# Patient Record
Sex: MALE | Race: WHITE | NOT HISPANIC OR LATINO | Employment: FULL TIME | URBAN - METROPOLITAN AREA
[De-identification: names, ages, dates, MRNs, and addresses within clinical notes are randomized per-mention and may not be internally consistent; named-entity substitution may affect disease eponyms.]

---

## 2018-03-02 ENCOUNTER — HOSPITAL ENCOUNTER (EMERGENCY)
Facility: HOSPITAL | Age: 43
Discharge: HOME | End: 2018-03-02
Attending: EMERGENCY MEDICINE
Payer: OTHER MISCELLANEOUS

## 2018-03-02 VITALS
HEART RATE: 82 BPM | HEIGHT: 67 IN | OXYGEN SATURATION: 98 % | DIASTOLIC BLOOD PRESSURE: 85 MMHG | SYSTOLIC BLOOD PRESSURE: 159 MMHG | WEIGHT: 183 LBS | TEMPERATURE: 98 F | BODY MASS INDEX: 28.72 KG/M2 | RESPIRATION RATE: 16 BRPM

## 2018-03-02 DIAGNOSIS — S46.002A ROTATOR CUFF INJURY, LEFT, INITIAL ENCOUNTER: Primary | ICD-10-CM

## 2018-03-02 PROCEDURE — 99283 EMERGENCY DEPT VISIT LOW MDM: CPT | Mod: 25

## 2018-03-02 PROCEDURE — 63600175 PHARM REV CODE 636 W HCPCS: Performed by: EMERGENCY MEDICINE

## 2018-03-02 PROCEDURE — 96372 THER/PROPH/DIAG INJ SC/IM: CPT

## 2018-03-02 PROCEDURE — 25000003 PHARM REV CODE 250: Performed by: EMERGENCY MEDICINE

## 2018-03-02 RX ORDER — ASPIRIN 81 MG/1
81 TABLET ORAL DAILY
Status: ON HOLD | COMMUNITY
End: 2020-11-19

## 2018-03-02 RX ORDER — HYDROCODONE BITARTRATE AND ACETAMINOPHEN 7.5; 325 MG/1; MG/1
1 TABLET ORAL EVERY 6 HOURS PRN
COMMUNITY
End: 2018-03-02 | Stop reason: ALTCHOICE

## 2018-03-02 RX ORDER — MEPERIDINE HYDROCHLORIDE 25 MG/ML
75 INJECTION INTRAMUSCULAR; INTRAVENOUS; SUBCUTANEOUS
Status: COMPLETED | OUTPATIENT
Start: 2018-03-02 | End: 2018-03-02

## 2018-03-02 RX ORDER — NAPROXEN 250 MG/1
500 TABLET ORAL
Status: COMPLETED | OUTPATIENT
Start: 2018-03-02 | End: 2018-03-02

## 2018-03-02 RX ORDER — DICLOFENAC SODIUM 50 MG/1
50 TABLET, DELAYED RELEASE ORAL 3 TIMES DAILY
Qty: 15 TABLET | Refills: 0 | Status: SHIPPED | OUTPATIENT
Start: 2018-03-02 | End: 2018-03-05 | Stop reason: SDUPTHER

## 2018-03-02 RX ORDER — OXYCODONE AND ACETAMINOPHEN 10; 325 MG/1; MG/1
1 TABLET ORAL EVERY 4 HOURS PRN
Qty: 15 TABLET | Refills: 0 | Status: ON HOLD | OUTPATIENT
Start: 2018-03-02 | End: 2020-11-19

## 2018-03-02 RX ADMIN — NAPROXEN 500 MG: 250 TABLET ORAL at 11:03

## 2018-03-02 RX ADMIN — MEPERIDINE HYDROCHLORIDE 75 MG: 25 INJECTION INTRAMUSCULAR; INTRAVENOUS; SUBCUTANEOUS at 11:03

## 2018-03-02 NOTE — ED PROVIDER NOTES
"Encounter Date: 3/2/2018    SCRIBE #1 NOTE: I, Smitha Vasquez, am scribing for, and in the presence of, Dr. Ley.       History     Chief Complaint   Patient presents with    Shoulder Pain     " hurt at work on wednesday " steroid injection Wednesday 03/02/2018 11:31 AM     Chief complaint: Shoulder Pain      John Ball is a 43 y.o. male who presents to the ED with complaints of gradually worsening left shoulder pain that started Wednesday (2/28). The patient states having a gradual onset of pain after crawling under pipes and climbing a ladder. The patient visited an Urgent Clinic after work on Wednesday and had an x-ray performed on him. Imaging showed negative results for anything abnormal and the patient was told he could go back to work today doing "light" work. Patient states pain worsened over night and he is unable to move it in specific directions. He visited his PCP yesterday (3/1) and was given a steroid and numbing injection that provided mild improvement to symptoms, but has since wore off and symptoms have returned. Patient is currently taking Percocet for pain with mild improvements to pain. The patient denies onset of any other new symptoms. He has no other medical concerns at the moment.       The history is provided by the patient.     Review of patient's allergies indicates:  No Known Allergies  History reviewed. No pertinent past medical history.  Past Surgical History:   Procedure Laterality Date    APPENDECTOMY      SINUS SURGERY      SPLENECTOMY, TOTAL      TONSILLECTOMY       History reviewed. No pertinent family history.  Social History   Substance Use Topics    Smoking status: Former Smoker     Packs/day: 0.25     Years: 0.50     Quit date: 5/5/1997    Smokeless tobacco: Never Used    Alcohol use Yes      Comment: social      Review of Systems   Constitutional: Negative for fever.   HENT: Negative for congestion.    Respiratory: Negative for shortness of breath.  "   Gastrointestinal: Negative for abdominal distention, abdominal pain and nausea.   Musculoskeletal: Positive for arthralgias (left shoulder). Negative for gait problem.   Neurological: Negative for weakness.   Psychiatric/Behavioral: Negative for confusion.       Physical Exam     Initial Vitals [03/02/18 1121]   BP Pulse Resp Temp SpO2   (!) 159/85 82 16 97.6 °F (36.4 °C) 98 %      MAP       109.67         Physical Exam    Nursing note and vitals reviewed.  Constitutional: He appears well-developed and well-nourished.   HENT:   Head: Normocephalic and atraumatic.   Eyes: Conjunctivae are normal.   Neck: Normal range of motion. Neck supple.   Cardiovascular: Normal rate, regular rhythm and normal heart sounds. Exam reveals no gallop and no friction rub.    No murmur heard.  Pulmonary/Chest: Breath sounds normal. No respiratory distress. He has no wheezes. He has no rhonchi. He has no rales.   Abdominal: Soft.   Musculoskeletal: Normal range of motion.        Right shoulder: He exhibits no tenderness and no effusion.   No palpable joint effusion. No tenderness to left shoulder.    Neurological: He is alert and oriented to person, place, and time.   Skin: Skin is warm and dry.   Psychiatric: He has a normal mood and affect.         ED Course   Procedures  Labs Reviewed - No data to display          Medical Decision Making:   History:   Old Medical Records: I decided to obtain old medical records.  ED Management:  John Ball is a 43 y.o. male who presents with  right shoulder pain worse with movement.  Symptoms are consistent with a rotator cuff injury.  He is placed in a sling and analgesics her switch from hydrocodone to oxycodone.  Do not feel that advanced imaging is necessary at this point in the acute injury [place] as it will not change therapy.  He is encouraged to follow-up with orthopedics in one week for further evaluation.       APC / Resident Notes:   I, Dr. Milad Ley III, personally performed the  services described in this documentation. All medical record entries made by the scribe were at my direction and in my presence.  I have reviewed the chart and agree that the record reflects my personal performance and is accurate and complete. Milad Ley III, MD.  3:30 PM 03/02/2018       Scribe Attestation:   Scribe #1: I performed the above scribed service and the documentation accurately describes the services I performed. I attest to the accuracy of the note.               Clinical Impression:   The encounter diagnosis was Rotator cuff injury, left, initial encounter.    Disposition:   Disposition: Discharged  Condition: Stable                        Milad Ley III, MD  03/02/18 1531

## 2018-03-02 NOTE — ED NOTES
Pt states he injured left shoulder Wed at work and has seen [facility]. Health at work and had XRay and given pain meds but meds are not working. Minimal ROM with left shoulder. Spouse at bedside.

## 2018-03-05 ENCOUNTER — HOSPITAL ENCOUNTER (OUTPATIENT)
Dept: RADIOLOGY | Facility: HOSPITAL | Age: 43
Discharge: HOME | End: 2018-03-05
Attending: ORTHOPAEDIC SURGERY
Payer: COMMERCIAL

## 2018-03-05 ENCOUNTER — OFFICE VISIT (OUTPATIENT)
Dept: ORTHOPEDICS | Facility: CLINIC | Age: 43
End: 2018-03-05
Payer: OTHER MISCELLANEOUS

## 2018-03-05 VITALS
BODY MASS INDEX: 28.72 KG/M2 | DIASTOLIC BLOOD PRESSURE: 102 MMHG | HEART RATE: 89 BPM | HEIGHT: 67 IN | WEIGHT: 183 LBS | SYSTOLIC BLOOD PRESSURE: 163 MMHG

## 2018-03-05 DIAGNOSIS — M75.32 CALCIFIC TENDINITIS OF LEFT SHOULDER: Primary | ICD-10-CM

## 2018-03-05 DIAGNOSIS — M25.512 LEFT SHOULDER PAIN, UNSPECIFIED CHRONICITY: Primary | ICD-10-CM

## 2018-03-05 DIAGNOSIS — M25.512 LEFT SHOULDER PAIN, UNSPECIFIED CHRONICITY: ICD-10-CM

## 2018-03-05 PROCEDURE — 99243 OFF/OP CNSLTJ NEW/EST LOW 30: CPT | Mod: S$GLB,,, | Performed by: ORTHOPAEDIC SURGERY

## 2018-03-05 PROCEDURE — 73030 X-RAY EXAM OF SHOULDER: CPT | Mod: 26,LT,, | Performed by: RADIOLOGY

## 2018-03-05 PROCEDURE — 99999 PR PBB SHADOW E&M-EST. PATIENT-LVL III: CPT | Mod: PBBFAC,,, | Performed by: ORTHOPAEDIC SURGERY

## 2018-03-05 PROCEDURE — 73030 X-RAY EXAM OF SHOULDER: CPT | Mod: TC,PN,LT

## 2018-03-05 RX ORDER — DICLOFENAC SODIUM 50 MG/1
50 TABLET, DELAYED RELEASE ORAL 3 TIMES DAILY
Qty: 90 TABLET | Refills: 0 | Status: SHIPPED | OUTPATIENT
Start: 2018-03-05 | End: 2019-03-05

## 2018-03-05 NOTE — LETTER
March 5, 2018        Rodriguez Tang MD  [address] [place] [id number]             [facility] [address]  Phone: [phone]   Patient: John Ball   MR Number: [id number]   YOB: 1975   Date of Visit: 3/5/2018       Dear Dr. Tang:    Thank you for referring John Ball to me for evaluation. Attached you will find relevant portions of my assessment and plan of care.    If you have questions, please do not hesitate to call me. I look forward to following John Ball along with you.    Sincerely,      Marquis Arenas MD            CC  No Recipients    Enclosure

## 2018-03-05 NOTE — PROGRESS NOTES
History reviewed. No pertinent past medical history.    Past Surgical History:   Procedure Laterality Date    APPENDECTOMY      SINUS SURGERY      SPLENECTOMY, TOTAL      TONSILLECTOMY         Current Outpatient Prescriptions   Medication Sig    aspirin (ECOTRIN) 81 MG EC tablet Take 81 mg by mouth once daily.    diclofenac (VOLTAREN) 50 MG EC tablet Take 1 tablet (50 mg total) by mouth 3 (three) times daily.    multivitamin (MULTIVITAMIN) per tablet Take 1 tablet by mouth once daily.      oxyCODONE-acetaminophen (PERCOCET)  mg per tablet Take 1 tablet by mouth every 4 (four) hours as needed for Pain.     No current facility-administered medications for this visit.        Review of patient's allergies indicates:  No Known Allergies    History reviewed. No pertinent family history.    Social History     Social History    Marital status:      Spouse name: N/A    Number of children: N/A    Years of education: N/A     Occupational History    Not on file.     Social History Main Topics    Smoking status: Former Smoker     Packs/day: 0.25     Years: 0.50     Quit date: 5/5/1997    Smokeless tobacco: Never Used    Alcohol use Yes      Comment: social     Drug use: Yes    Sexual activity: Yes     Partners: Female     Birth control/ protection: None     Other Topics Concern    Not on file     Social History Narrative    No narrative on file       Chief Complaint:   Chief Complaint   Patient presents with    Left Shoulder - Pain       History of present illness: Is a 43-year-old right-hand-dominant male seen for left shoulder pain.  Patient is seen in consultation for Dr. Tang.  Patient started having pain while at work on February 28, 2018.  There was no injury or trauma but had rapid onset while climbing a ladder.  Symptoms are improving and are moderate.  He had a subacromial cortisone injection and was placed on Voltaren.  Pain as a 5 out of 10.      Review of Systems:    Constitution:  Negative for chills, fever, and sweats.  Negative for unexplained weight loss.    HENT:  Negative for headaches and blurry vision.    Cardiovascular:Negative for chest pain or irregular heart beat. Negative for hypertension.    Respiratory:  Negative for cough and shortness of breath.    Gastrointestinal: Negative for abdominal pain, heartburn, melena, nausea, and vomitting.    Genitourinary:  Negative bladder incontinence and dysuria.    Musculoskeletal:  See HPI    Neurological: Negative for numbness.    Psychiatric/Behavioral: Negative for depression.  The patient is not nervous/anxious.      Endocrine: Negative for polyuria    Hematologic/Lymphatic: Negative for bleeding problem.  Does not bruise/bleed easily.    Skin: Negative for poor would healing and rash      Physical Examination:    Vital Signs:    Vitals:    03/05/18 0810   BP: (!) 163/102   Pulse: 89       Body mass index is 28.66 kg/m².    This a well-developed, well nourished patient in no acute distress.  They are alert and oriented and cooperative to examination.  Pt. walks without an antalgic gait.      Examination of the left shoulder shows no rashes or erythema. There are no masses, ecchymosis, or atrophy. The patient has full range of motion in forward flexion, external rotation, and internal rotation to the mid T-spine. The patient has moderately positive impingement signs. - [place]'s test. - Speeds test. Nontender to palpation over a.c. joint. Normal stability anteriorly, posteriorly, and negative sulcus sign. Passive range of motion: Forward flexion of 180°, external rotation at 90° of 90°, internal rotation of 50°, and external rotation at 0° of 50°. 2+ radial pulse. Intact axillary, radial, median and ulnar sensation. 5 out of 5 resisted forward flexion, external rotation, and negative lift off test.    Examination of the right shoulder shows no rashes or erythema. There are no masses, ecchymosis, or atrophy. The patient has full range of  motion in forward flexion, external rotation, and internal rotation to the mid T-spine. The patient has - impingement signs. - Gilchrist's test. - Speeds test. Nontender to palpation over a.c. joint. Normal stability anteriorly, posteriorly, and negative sulcus sign. Passive range of motion: Forward flexion of 180°, external rotation at 90° of 90°, internal rotation of 50°, and external rotation at 0° of 50°. 2+ radial pulse. Intact axillary, radial, median and ulnar sensation. 5 out of 5 resisted forward flexion, external rotation, and negative lift off test.    X-rays: X-rays left shoulder ordered and reviewed which show a small calcific body within the rotator cuff or bursa     Assessment:: Left calcific tendinitis    Plan:  I reviewed the findings with him today.  He is doing better after the cortisone injection and the Voltaren.  I refilled his diclofenac.  Talked about possibly getting an MRI if the shoulder starts to worsen again.  We will keep him on light duty the rest of the week and return to full duty next week.    This note was created using Dragon voice recognition software that occasionally misinterpreted phrases or words.    Consult note is delivered via Epic messaging service.

## 2019-07-23 ENCOUNTER — LAB VISIT (OUTPATIENT)
Dept: LAB | Facility: HOSPITAL | Age: 44
End: 2019-07-23
Attending: INTERNAL MEDICINE
Payer: COMMERCIAL

## 2019-07-23 DIAGNOSIS — Z79.899 DRUG THERAPY: ICD-10-CM

## 2019-07-23 DIAGNOSIS — E78.5 HYPERLIPIDEMIA: Primary | ICD-10-CM

## 2019-07-23 DIAGNOSIS — K22.70 BARRETT'S ESOPHAGUS: ICD-10-CM

## 2019-07-23 LAB
ALBUMIN SERPL BCP-MCNC: 4.4 G/DL (ref 3.5–5.2)
ALP SERPL-CCNC: 75 U/L (ref 55–135)
ALT SERPL W/O P-5'-P-CCNC: 30 U/L (ref 10–44)
AST SERPL-CCNC: 23 U/L (ref 10–40)
BILIRUB DIRECT SERPL-MCNC: 0.1 MG/DL (ref 0.1–0.3)
BILIRUB SERPL-MCNC: 0.8 MG/DL (ref 0.1–1)
CHOLEST SERPL-MCNC: 144 MG/DL (ref 120–199)
CHOLEST/HDLC SERPL: 3.3 {RATIO} (ref 2–5)
HDLC SERPL-MCNC: 43 MG/DL (ref 40–75)
HDLC SERPL: 29.9 % (ref 20–50)
LDLC SERPL CALC-MCNC: 69.2 MG/DL (ref 63–159)
NONHDLC SERPL-MCNC: 101 MG/DL
PROT SERPL-MCNC: 8.1 G/DL (ref 6–8.4)
TRIGL SERPL-MCNC: 159 MG/DL (ref 30–150)

## 2019-07-23 PROCEDURE — 80061 LIPID PANEL: CPT

## 2019-07-23 PROCEDURE — 36415 COLL VENOUS BLD VENIPUNCTURE: CPT

## 2019-07-23 PROCEDURE — 80076 HEPATIC FUNCTION PANEL: CPT

## 2019-11-04 DIAGNOSIS — K85.90 ACUTE PANCREATITIS: Primary | ICD-10-CM

## 2019-11-05 DIAGNOSIS — R10.13 EPIGASTRIC PAIN: Primary | ICD-10-CM

## 2019-11-12 ENCOUNTER — HOSPITAL ENCOUNTER (OUTPATIENT)
Dept: RADIOLOGY | Facility: HOSPITAL | Age: 44
Discharge: HOME | End: 2019-11-12
Attending: INTERNAL MEDICINE
Payer: COMMERCIAL

## 2019-11-12 DIAGNOSIS — R10.13 EPIGASTRIC PAIN: ICD-10-CM

## 2019-11-12 DIAGNOSIS — K85.90 ACUTE PANCREATITIS: ICD-10-CM

## 2019-11-12 PROCEDURE — 76705 ECHO EXAM OF ABDOMEN: CPT | Mod: TC,PO

## 2019-11-12 PROCEDURE — 74160 CT ABDOMEN W/CONTRAST: CPT | Mod: TC,PO

## 2019-11-12 PROCEDURE — 25500020 PHARM REV CODE 255: Mod: PO | Performed by: INTERNAL MEDICINE

## 2019-11-12 RX ADMIN — IOHEXOL 100 ML: 350 INJECTION, SOLUTION INTRAVENOUS at 09:11

## 2020-03-17 NOTE — TELEPHONE ENCOUNTER
No answer at listed number. Could not leave a message.    My instructions would be: he should have a rx of antibiotics(levaquin 750 mg) in his possession at all times.   If he gets a hard chill or high fever, over 101, he should take one of the levaquin on his way to the closest hospital. This is to protect him from BACTERIAL infections. Antibiotics do not protect against the coronavirus. This is the same advice I provided in 2017.     If he is concerned that he  has the coronavirus, he should make contact, by phone,  with his primary care provider about his symptoms to determine what would be recommended, for example, whether his symptoms do not suggest coronavirus or whether he needs a test, or whether to self quarantine at home.   He should review the cdc.gov website to learn how to protect himself (same instructions we are giving everyone).

## 2020-03-17 NOTE — TELEPHONE ENCOUNTER
Says he is w/o spleen and wants to know what he should do if starts to run a fever with all this corona virus going on.    Jocelin Ram [facility]

## 2020-10-30 ENCOUNTER — HOSPITAL ENCOUNTER (OUTPATIENT)
Dept: RADIOLOGY | Facility: HOSPITAL | Age: 45
Discharge: HOME | End: 2020-10-30
Attending: INTERNAL MEDICINE

## 2020-11-02 DIAGNOSIS — K65.4 SCLEROSING MESENTERITIS: Primary | ICD-10-CM

## 2020-11-15 ENCOUNTER — HOSPITAL ENCOUNTER (INPATIENT)
Facility: HOSPITAL | Age: 45
LOS: 3 days | Discharge: HOME | DRG: 864 | End: 2020-11-19
Attending: EMERGENCY MEDICINE | Admitting: INTERNAL MEDICINE
Payer: COMMERCIAL

## 2020-11-15 DIAGNOSIS — R50.9 FEVER, UNSPECIFIED FEVER CAUSE: Primary | ICD-10-CM

## 2020-11-15 DIAGNOSIS — Z01.84 ENCOUNTER FOR ANTIBODY RESPONSE EXAMINATION: ICD-10-CM

## 2020-11-15 DIAGNOSIS — Z20.822 SUSPECTED COVID-19 VIRUS INFECTION: ICD-10-CM

## 2020-11-15 DIAGNOSIS — E78.2 MIXED HYPERLIPIDEMIA: Chronic | ICD-10-CM

## 2020-11-15 DIAGNOSIS — R50.9 FEVER: ICD-10-CM

## 2020-11-15 DIAGNOSIS — Z90.81 ASPLENIA AFTER SURGICAL PROCEDURE: ICD-10-CM

## 2020-11-15 DIAGNOSIS — R00.0 TACHYCARDIA: ICD-10-CM

## 2020-11-15 DIAGNOSIS — J18.9 COMMUNITY ACQUIRED PNEUMONIA: ICD-10-CM

## 2020-11-15 LAB
ALBUMIN SERPL BCP-MCNC: 4 G/DL (ref 3.5–5.2)
ALP SERPL-CCNC: 52 U/L (ref 55–135)
ALT SERPL W/O P-5'-P-CCNC: 31 U/L (ref 10–44)
ANION GAP SERPL CALC-SCNC: 10 MMOL/L (ref 8–16)
AST SERPL-CCNC: 27 U/L (ref 10–40)
BASOPHILS # BLD AUTO: 0.04 K/UL (ref 0–0.2)
BASOPHILS NFR BLD: 0.3 % (ref 0–1.9)
BILIRUB SERPL-MCNC: 0.8 MG/DL (ref 0.1–1)
BILIRUB UR QL STRIP: NEGATIVE
BUN SERPL-MCNC: 11 MG/DL (ref 6–20)
CALCIUM SERPL-MCNC: 9 MG/DL (ref 8.7–10.5)
CHLORIDE SERPL-SCNC: 100 MMOL/L (ref 95–110)
CLARITY UR: CLEAR
CO2 SERPL-SCNC: 26 MMOL/L (ref 23–29)
COLOR UR: YELLOW
CREAT SERPL-MCNC: 1.1 MG/DL (ref 0.5–1.4)
DIFFERENTIAL METHOD: ABNORMAL
EOSINOPHIL # BLD AUTO: 0.1 K/UL (ref 0–0.5)
EOSINOPHIL NFR BLD: 0.5 % (ref 0–8)
ERYTHROCYTE [DISTWIDTH] IN BLOOD BY AUTOMATED COUNT: 14.6 % (ref 11.5–14.5)
EST. GFR  (AFRICAN AMERICAN): >60 ML/MIN/1.73 M^2
EST. GFR  (NON AFRICAN AMERICAN): >60 ML/MIN/1.73 M^2
GLUCOSE SERPL-MCNC: 112 MG/DL (ref 70–110)
GLUCOSE UR QL STRIP: NEGATIVE
HCT VFR BLD AUTO: 43.9 % (ref 40–54)
HGB BLD-MCNC: 14.9 G/DL (ref 14–18)
HGB UR QL STRIP: NEGATIVE
IMM GRANULOCYTES # BLD AUTO: 0.06 K/UL (ref 0–0.04)
IMM GRANULOCYTES NFR BLD AUTO: 0.5 % (ref 0–0.5)
INFLUENZA A, MOLECULAR: NEGATIVE
INFLUENZA B, MOLECULAR: NEGATIVE
KETONES UR QL STRIP: NEGATIVE
LEUKOCYTE ESTERASE UR QL STRIP: NEGATIVE
LYMPHOCYTES # BLD AUTO: 1 K/UL (ref 1–4.8)
LYMPHOCYTES NFR BLD: 7.9 % (ref 18–48)
MCH RBC QN AUTO: 32 PG (ref 27–31)
MCHC RBC AUTO-ENTMCNC: 33.9 G/DL (ref 32–36)
MCV RBC AUTO: 94 FL (ref 82–98)
MONOCYTES # BLD AUTO: 1.6 K/UL (ref 0.3–1)
MONOCYTES NFR BLD: 12.8 % (ref 4–15)
NEUTROPHILS # BLD AUTO: 10 K/UL (ref 1.8–7.7)
NEUTROPHILS NFR BLD: 78 % (ref 38–73)
NITRITE UR QL STRIP: NEGATIVE
NRBC BLD-RTO: 0 /100 WBC
PH UR STRIP: 7 [PH] (ref 5–8)
PLATELET # BLD AUTO: 345 K/UL (ref 150–350)
PMV BLD AUTO: 10.3 FL (ref 9.2–12.9)
POTASSIUM SERPL-SCNC: 3.5 MMOL/L (ref 3.5–5.1)
PROT SERPL-MCNC: 6.9 G/DL (ref 6–8.4)
PROT UR QL STRIP: NEGATIVE
RBC # BLD AUTO: 4.65 M/UL (ref 4.6–6.2)
SARS-COV-2 RDRP RESP QL NAA+PROBE: NEGATIVE
SODIUM SERPL-SCNC: 136 MMOL/L (ref 136–145)
SP GR UR STRIP: 1.01 (ref 1–1.03)
SPECIMEN SOURCE: NORMAL
URN SPEC COLLECT METH UR: NORMAL
UROBILINOGEN UR STRIP-ACNC: NEGATIVE EU/DL
WBC # BLD AUTO: 12.83 K/UL (ref 3.9–12.7)

## 2020-11-15 PROCEDURE — 96361 HYDRATE IV INFUSION ADD-ON: CPT

## 2020-11-15 PROCEDURE — 87502 INFLUENZA DNA AMP PROBE: CPT

## 2020-11-15 PROCEDURE — 99285 EMERGENCY DEPT VISIT HI MDM: CPT | Mod: 25

## 2020-11-15 PROCEDURE — 93010 ELECTROCARDIOGRAM REPORT: CPT | Mod: ,,, | Performed by: INTERNAL MEDICINE

## 2020-11-15 PROCEDURE — 81003 URINALYSIS AUTO W/O SCOPE: CPT

## 2020-11-15 PROCEDURE — U0002 COVID-19 LAB TEST NON-CDC: HCPCS

## 2020-11-15 PROCEDURE — 25000003 PHARM REV CODE 250: Performed by: EMERGENCY MEDICINE

## 2020-11-15 PROCEDURE — 93005 ELECTROCARDIOGRAM TRACING: CPT | Performed by: INTERNAL MEDICINE

## 2020-11-15 PROCEDURE — 80053 COMPREHEN METABOLIC PANEL: CPT

## 2020-11-15 PROCEDURE — 93010 EKG 12-LEAD: ICD-10-PCS | Mod: ,,, | Performed by: INTERNAL MEDICINE

## 2020-11-15 PROCEDURE — 85025 COMPLETE CBC W/AUTO DIFF WBC: CPT

## 2020-11-15 RX ORDER — ACETAMINOPHEN 500 MG
1000 TABLET ORAL
Status: COMPLETED | OUTPATIENT
Start: 2020-11-15 | End: 2020-11-15

## 2020-11-15 RX ORDER — SODIUM CHLORIDE 9 MG/ML
1000 INJECTION, SOLUTION INTRAVENOUS
Status: COMPLETED | OUTPATIENT
Start: 2020-11-15 | End: 2020-11-16

## 2020-11-15 RX ADMIN — ACETAMINOPHEN 1000 MG: 500 TABLET, FILM COATED ORAL at 10:11

## 2020-11-15 RX ADMIN — SODIUM CHLORIDE 1000 ML: 0.9 INJECTION, SOLUTION INTRAVENOUS at 10:11

## 2020-11-16 PROBLEM — J18.9 PNEUMONIA DUE TO INFECTIOUS ORGANISM: Status: ACTIVE | Noted: 2020-11-16

## 2020-11-16 PROBLEM — J18.9 COMMUNITY ACQUIRED PNEUMONIA: Status: ACTIVE | Noted: 2020-11-16

## 2020-11-16 LAB
ANION GAP SERPL CALC-SCNC: 8 MMOL/L (ref 8–16)
BASOPHILS # BLD AUTO: 0.04 K/UL (ref 0–0.2)
BASOPHILS NFR BLD: 0.3 % (ref 0–1.9)
BUN SERPL-MCNC: 9 MG/DL (ref 6–20)
CALCIUM SERPL-MCNC: 8.4 MG/DL (ref 8.7–10.5)
CHLORIDE SERPL-SCNC: 108 MMOL/L (ref 95–110)
CO2 SERPL-SCNC: 23 MMOL/L (ref 23–29)
CREAT SERPL-MCNC: 0.9 MG/DL (ref 0.5–1.4)
CRP SERPL-MCNC: 1.11 MG/DL
DIFFERENTIAL METHOD: ABNORMAL
EOSINOPHIL # BLD AUTO: 0 K/UL (ref 0–0.5)
EOSINOPHIL NFR BLD: 0.1 % (ref 0–8)
ERYTHROCYTE [DISTWIDTH] IN BLOOD BY AUTOMATED COUNT: 14.7 % (ref 11.5–14.5)
EST. GFR  (AFRICAN AMERICAN): >60 ML/MIN/1.73 M^2
EST. GFR  (NON AFRICAN AMERICAN): >60 ML/MIN/1.73 M^2
FERRITIN SERPL-MCNC: 60 NG/ML (ref 20–300)
GLUCOSE SERPL-MCNC: 101 MG/DL (ref 70–110)
GLUCOSE SERPL-MCNC: 103 MG/DL (ref 70–110)
GLUCOSE SERPL-MCNC: 104 MG/DL (ref 70–110)
HCT VFR BLD AUTO: 40.3 % (ref 40–54)
HGB BLD-MCNC: 13.7 G/DL (ref 14–18)
HIV1+2 IGG SERPL QL IA.RAPID: NEGATIVE
IMM GRANULOCYTES # BLD AUTO: 0.05 K/UL (ref 0–0.04)
IMM GRANULOCYTES NFR BLD AUTO: 0.4 % (ref 0–0.5)
LDH SERPL L TO P-CCNC: 104 U/L (ref 110–260)
LYMPHOCYTES # BLD AUTO: 1 K/UL (ref 1–4.8)
LYMPHOCYTES NFR BLD: 7.8 % (ref 18–48)
MCH RBC QN AUTO: 31.7 PG (ref 27–31)
MCHC RBC AUTO-ENTMCNC: 34 G/DL (ref 32–36)
MCV RBC AUTO: 93 FL (ref 82–98)
MONOCYTES # BLD AUTO: 2.3 K/UL (ref 0.3–1)
MONOCYTES NFR BLD: 17.2 % (ref 4–15)
NEUTROPHILS # BLD AUTO: 9.8 K/UL (ref 1.8–7.7)
NEUTROPHILS NFR BLD: 74.2 % (ref 38–73)
NRBC BLD-RTO: 0 /100 WBC
PLATELET # BLD AUTO: 329 K/UL (ref 150–350)
PMV BLD AUTO: 10.1 FL (ref 9.2–12.9)
POTASSIUM SERPL-SCNC: 3.6 MMOL/L (ref 3.5–5.1)
RBC # BLD AUTO: 4.32 M/UL (ref 4.6–6.2)
SODIUM SERPL-SCNC: 139 MMOL/L (ref 136–145)
WBC # BLD AUTO: 13.23 K/UL (ref 3.9–12.7)

## 2020-11-16 PROCEDURE — 94761 N-INVAS EAR/PLS OXIMETRY MLT: CPT

## 2020-11-16 PROCEDURE — 96374 THER/PROPH/DIAG INJ IV PUSH: CPT

## 2020-11-16 PROCEDURE — 99900031 HC PATIENT EDUCATION (STAT)

## 2020-11-16 PROCEDURE — U0003 INFECTIOUS AGENT DETECTION BY NUCLEIC ACID (DNA OR RNA); SEVERE ACUTE RESPIRATORY SYNDROME CORONAVIRUS 2 (SARS-COV-2) (CORONAVIRUS DISEASE [COVID-19]), AMPLIFIED PROBE TECHNIQUE, MAKING USE OF HIGH THROUGHPUT TECHNOLOGIES AS DESCRIBED BY CMS-2020-01-R: HCPCS

## 2020-11-16 PROCEDURE — 87633 RESP VIRUS 12-25 TARGETS: CPT

## 2020-11-16 PROCEDURE — 94640 AIRWAY INHALATION TREATMENT: CPT

## 2020-11-16 PROCEDURE — 25000003 PHARM REV CODE 250: Performed by: INTERNAL MEDICINE

## 2020-11-16 PROCEDURE — 25000003 PHARM REV CODE 250: Performed by: EMERGENCY MEDICINE

## 2020-11-16 PROCEDURE — 83615 LACTATE (LD) (LDH) ENZYME: CPT

## 2020-11-16 PROCEDURE — 36415 COLL VENOUS BLD VENIPUNCTURE: CPT

## 2020-11-16 PROCEDURE — 87040 BLOOD CULTURE FOR BACTERIA: CPT | Mod: 59

## 2020-11-16 PROCEDURE — 63700000 PHARM REV CODE 250 ALT 637 W/O HCPCS: Performed by: INTERNAL MEDICINE

## 2020-11-16 PROCEDURE — 82728 ASSAY OF FERRITIN: CPT

## 2020-11-16 PROCEDURE — 25000242 PHARM REV CODE 250 ALT 637 W/ HCPCS: Performed by: EMERGENCY MEDICINE

## 2020-11-16 PROCEDURE — 99255 PR INITIAL INPATIENT CONSULT,LEVL V: ICD-10-PCS | Mod: ,,, | Performed by: INTERNAL MEDICINE

## 2020-11-16 PROCEDURE — 86140 C-REACTIVE PROTEIN: CPT

## 2020-11-16 PROCEDURE — 85025 COMPLETE CBC W/AUTO DIFF WBC: CPT

## 2020-11-16 PROCEDURE — 80048 BASIC METABOLIC PNL TOTAL CA: CPT

## 2020-11-16 PROCEDURE — 99255 IP/OBS CONSLTJ NEW/EST HI 80: CPT | Mod: ,,, | Performed by: INTERNAL MEDICINE

## 2020-11-16 PROCEDURE — 86703 HIV-1/HIV-2 1 RESULT ANTBDY: CPT

## 2020-11-16 PROCEDURE — 99900035 HC TECH TIME PER 15 MIN (STAT)

## 2020-11-16 PROCEDURE — 12000002 HC ACUTE/MED SURGE SEMI-PRIVATE ROOM

## 2020-11-16 PROCEDURE — 63600175 PHARM REV CODE 636 W HCPCS: Performed by: EMERGENCY MEDICINE

## 2020-11-16 PROCEDURE — 96361 HYDRATE IV INFUSION ADD-ON: CPT

## 2020-11-16 RX ORDER — POTASSIUM CHLORIDE 7.45 MG/ML
40 INJECTION INTRAVENOUS
Status: DISCONTINUED | OUTPATIENT
Start: 2020-11-16 | End: 2020-11-19 | Stop reason: HOSPADM

## 2020-11-16 RX ORDER — ACETAMINOPHEN 325 MG/1
650 TABLET ORAL EVERY 8 HOURS PRN
Status: DISCONTINUED | OUTPATIENT
Start: 2020-11-16 | End: 2020-11-16

## 2020-11-16 RX ORDER — POTASSIUM CHLORIDE 20 MEQ/1
20 TABLET, EXTENDED RELEASE ORAL
Status: DISCONTINUED | OUTPATIENT
Start: 2020-11-16 | End: 2020-11-19 | Stop reason: HOSPADM

## 2020-11-16 RX ORDER — MAGNESIUM SULFATE HEPTAHYDRATE 40 MG/ML
4 INJECTION, SOLUTION INTRAVENOUS
Status: DISCONTINUED | OUTPATIENT
Start: 2020-11-16 | End: 2020-11-19 | Stop reason: HOSPADM

## 2020-11-16 RX ORDER — AZITHROMYCIN 250 MG/1
250 TABLET, FILM COATED ORAL DAILY
Qty: 6 TABLET | Refills: 0 | Status: SHIPPED | OUTPATIENT
Start: 2020-11-16 | End: 2020-11-19 | Stop reason: HOSPADM

## 2020-11-16 RX ORDER — POTASSIUM CHLORIDE 7.45 MG/ML
20 INJECTION INTRAVENOUS
Status: DISCONTINUED | OUTPATIENT
Start: 2020-11-16 | End: 2020-11-19 | Stop reason: HOSPADM

## 2020-11-16 RX ORDER — CALCIUM CHLORIDE IN 0.9 % NACL 1 G/100 ML
1 INTRAVENOUS SOLUTION, PIGGYBACK (ML) INTRAVENOUS
Status: DISCONTINUED | OUTPATIENT
Start: 2020-11-16 | End: 2020-11-19 | Stop reason: HOSPADM

## 2020-11-16 RX ORDER — IPRATROPIUM BROMIDE AND ALBUTEROL SULFATE 2.5; .5 MG/3ML; MG/3ML
3 SOLUTION RESPIRATORY (INHALATION)
Status: ACTIVE | OUTPATIENT
Start: 2020-11-16 | End: 2020-11-16

## 2020-11-16 RX ORDER — IBUPROFEN 400 MG/1
400 TABLET ORAL
Status: COMPLETED | OUTPATIENT
Start: 2020-11-16 | End: 2020-11-16

## 2020-11-16 RX ORDER — IBUPROFEN 400 MG/1
400 TABLET ORAL EVERY 6 HOURS PRN
Status: DISCONTINUED | OUTPATIENT
Start: 2020-11-16 | End: 2020-11-19 | Stop reason: HOSPADM

## 2020-11-16 RX ORDER — ACETAMINOPHEN 500 MG
1000 TABLET ORAL EVERY 6 HOURS PRN
Status: DISCONTINUED | OUTPATIENT
Start: 2020-11-16 | End: 2020-11-19 | Stop reason: HOSPADM

## 2020-11-16 RX ORDER — CEFTRIAXONE 1 G/1
1 INJECTION, POWDER, FOR SOLUTION INTRAMUSCULAR; INTRAVENOUS
Status: COMPLETED | OUTPATIENT
Start: 2020-11-16 | End: 2020-11-16

## 2020-11-16 RX ORDER — AZITHROMYCIN 250 MG/1
500 TABLET, FILM COATED ORAL DAILY
Status: DISCONTINUED | OUTPATIENT
Start: 2020-11-16 | End: 2020-11-16

## 2020-11-16 RX ORDER — SODIUM CHLORIDE 9 MG/ML
500 INJECTION, SOLUTION INTRAVENOUS
Status: COMPLETED | OUTPATIENT
Start: 2020-11-16 | End: 2020-11-16

## 2020-11-16 RX ORDER — LANOLIN ALCOHOL/MO/W.PET/CERES
800 CREAM (GRAM) TOPICAL
Status: DISCONTINUED | OUTPATIENT
Start: 2020-11-16 | End: 2020-11-19 | Stop reason: HOSPADM

## 2020-11-16 RX ORDER — POTASSIUM CHLORIDE 20 MEQ/1
40 TABLET, EXTENDED RELEASE ORAL
Status: DISCONTINUED | OUTPATIENT
Start: 2020-11-16 | End: 2020-11-19 | Stop reason: HOSPADM

## 2020-11-16 RX ORDER — MAGNESIUM SULFATE HEPTAHYDRATE 40 MG/ML
2 INJECTION, SOLUTION INTRAVENOUS
Status: DISCONTINUED | OUTPATIENT
Start: 2020-11-16 | End: 2020-11-19 | Stop reason: HOSPADM

## 2020-11-16 RX ORDER — DOXYCYCLINE 100 MG/1
100 CAPSULE ORAL EVERY 12 HOURS
Status: DISCONTINUED | OUTPATIENT
Start: 2020-11-16 | End: 2020-11-19 | Stop reason: HOSPADM

## 2020-11-16 RX ORDER — ASPIRIN 81 MG/1
81 TABLET ORAL DAILY
Status: DISCONTINUED | OUTPATIENT
Start: 2020-11-16 | End: 2020-11-17

## 2020-11-16 RX ORDER — ALBUTEROL SULFATE 0.83 MG/ML
2.5 SOLUTION RESPIRATORY (INHALATION) EVERY 4 HOURS PRN
Status: DISCONTINUED | OUTPATIENT
Start: 2020-11-16 | End: 2020-11-19 | Stop reason: HOSPADM

## 2020-11-16 RX ORDER — ATORVASTATIN CALCIUM 10 MG/1
10 TABLET, FILM COATED ORAL DAILY
Status: DISCONTINUED | OUTPATIENT
Start: 2020-11-16 | End: 2020-11-17

## 2020-11-16 RX ORDER — ONDANSETRON 2 MG/ML
4 INJECTION INTRAMUSCULAR; INTRAVENOUS EVERY 8 HOURS PRN
Status: DISCONTINUED | OUTPATIENT
Start: 2020-11-16 | End: 2020-11-19 | Stop reason: HOSPADM

## 2020-11-16 RX ORDER — OXYCODONE AND ACETAMINOPHEN 10; 325 MG/1; MG/1
1 TABLET ORAL EVERY 4 HOURS PRN
Status: DISCONTINUED | OUTPATIENT
Start: 2020-11-16 | End: 2020-11-19 | Stop reason: HOSPADM

## 2020-11-16 RX ORDER — MAGNESIUM SULFATE 1 G/100ML
1 INJECTION INTRAVENOUS
Status: DISCONTINUED | OUTPATIENT
Start: 2020-11-16 | End: 2020-11-19 | Stop reason: HOSPADM

## 2020-11-16 RX ADMIN — CEFTRIAXONE SODIUM 1 G: 1 INJECTION, POWDER, FOR SOLUTION INTRAMUSCULAR; INTRAVENOUS at 02:11

## 2020-11-16 RX ADMIN — ASPIRIN 81 MG: 81 TABLET, DELAYED RELEASE ORAL at 08:11

## 2020-11-16 RX ADMIN — ATORVASTATIN CALCIUM 10 MG: 10 TABLET, FILM COATED ORAL at 08:11

## 2020-11-16 RX ADMIN — ACETAMINOPHEN 650 MG: 325 TABLET, FILM COATED ORAL at 09:11

## 2020-11-16 RX ADMIN — AZITHROMYCIN 500 MG: 250 TABLET, FILM COATED ORAL at 08:11

## 2020-11-16 RX ADMIN — IPRATROPIUM BROMIDE AND ALBUTEROL SULFATE 3 ML: .5; 3 SOLUTION RESPIRATORY (INHALATION) at 01:11

## 2020-11-16 RX ADMIN — IBUPROFEN 400 MG: 400 TABLET ORAL at 02:11

## 2020-11-16 RX ADMIN — DOXYCYCLINE HYCLATE 100 MG: 100 CAPSULE ORAL at 08:11

## 2020-11-16 RX ADMIN — SODIUM CHLORIDE 500 ML: 0.9 INJECTION, SOLUTION INTRAVENOUS at 02:11

## 2020-11-16 RX ADMIN — ACETAMINOPHEN 1000 MG: 500 TABLET, FILM COATED ORAL at 05:11

## 2020-11-16 RX ADMIN — THERA TABS 1 TABLET: TAB at 08:11

## 2020-11-16 RX ADMIN — POTASSIUM CHLORIDE 40 MEQ: 20 TABLET, EXTENDED RELEASE ORAL at 10:11

## 2020-11-16 NOTE — H&P
[facility] Medicine  History & Physical    Patient Name: oJhn Ball  MRN: [id number]  Admission Date: 11/15/2020  Attending Physician: Delroy Jiang MD  Primary Care Provider: Rodriguez Tang MD         Patient information was obtained from patient, spouse/SO and ER records.     Subjective:     Principal Problem:<principal problem not specified>    Chief Complaint:   Chief Complaint   Patient presents with    Fever     TMAX (102.8 - took 800mg ibuprofen PTA). Denies cough, dysuria, n/v/d, or CP/SOB. Spleenectomy 2011 - was told to come to the ED if spiked a temperature per Dr. Miryam Caruso. Took 750mg of levofloxacin.         HPI: 45 year old male (Hx hyperlipidemia) presented to ED complaining of 102.1 fever, and generalized malaise and fatigue. No known sick contacts. Is status post traumatic splenectomy. Was warned by ID to present to a facility if ever febrile with respiratory illness. Has dry cough. No CP/SOB. In ED: labs revealed  Mild leukocytosis with normal hematocrit; normal electrolytes and renal function. COVID and influenza negative. CXR: no infiltrate. His oxygen saturation was reportedly 92% (not noted in chart) improving to 95% with supplemental oxygen. He has had tachycardia since presentation 110's (sinus). Discussed with ED provider, early pneumonia may not show infiltrate yet on CXR. Tmax in ED: 100.4    History reviewed. No pertinent past medical history.    Past Surgical History:   Procedure Laterality Date    APPENDECTOMY      SINUS SURGERY      SPLENECTOMY, TOTAL      TONSILLECTOMY         Review of patient's allergies indicates:  No Known Allergies    No current facility-administered medications on file prior to encounter.      Current Outpatient Medications on File Prior to Encounter   Medication Sig    aspirin (ECOTRIN) 81 MG EC tablet Take 81 mg by mouth once daily.    multivitamin (MULTIVITAMIN) per tablet Take 1 tablet by mouth once daily.       oxyCODONE-acetaminophen (PERCOCET)  mg per tablet Take 1 tablet by mouth every 4 (four) hours as needed for Pain.     Family History     None        Tobacco Use    Smoking status: Former Smoker     Packs/day: 0.25     Years: 0.50     Pack years: 0.12     Quit date: 1997     Years since quittin.5    Smokeless tobacco: Never Used   Substance and Sexual Activity    Alcohol use: Yes     Comment: social     Drug use: Yes    Sexual activity: Yes     Partners: Female     Birth control/protection: None     Review of Systems   Constitutional: Positive for chills, fatigue and fever.   HENT: Negative.    Eyes: Negative.    Respiratory: Positive for cough.    Cardiovascular: Negative.    Gastrointestinal: Negative.    Endocrine: Negative.    Genitourinary: Negative.    Musculoskeletal: Negative.    Skin: Negative.    Allergic/Immunologic: Negative.    Neurological: Negative.    Hematological: Negative.    All other systems reviewed and are negative.    Objective:     Vital Signs (Most Recent):  Temp: (!) 100.4 °F (38 °C) (20 0220)  Pulse: (!) 114 (20 0109)  Resp: 18 (20 010)  BP: 128/67 (20 0100)  SpO2: 98 % (20 0115) Vital Signs (24h Range):  Temp:  [99.1 °F (37.3 °C)-100.4 °F (38 °C)] 100.4 °F (38 °C)  Pulse:  [103-133] 114  Resp:  [17-20] 18  SpO2:  [95 %-98 %] 98 %  BP: (118-142)/(66-77) 128/67     Weight: 88.5 kg (195 lb)  Body mass index is 27.98 kg/m².    Physical Exam  Vitals signs and nursing note reviewed.   Constitutional:       Appearance: He is well-developed.   HENT:      Head: Normocephalic and atraumatic.      Right Ear: External ear normal.      Left Ear: External ear normal.      Nose: Nose normal.   Eyes:      Conjunctiva/sclera: Conjunctivae normal.      Pupils: Pupils are equal, round, and reactive to light.   Neck:      Musculoskeletal: Normal range of motion and neck supple.   Cardiovascular:      Rate and Rhythm: Normal rate and regular rhythm.       Heart sounds: Normal heart sounds.   Pulmonary:      Effort: Pulmonary effort is normal.      Breath sounds: Normal breath sounds.      Comments: Decreased entry with large airway sounds clearing; no wheeze (just had nebs) or opening crepitations  Abdominal:      General: Bowel sounds are normal.      Palpations: Abdomen is soft.   Musculoskeletal: Normal range of motion.   Skin:     General: Skin is warm and dry.      Capillary Refill: Capillary refill takes less than 2 seconds.   Neurological:      Mental Status: He is alert and oriented to person, place, and time.   Psychiatric:         Behavior: Behavior normal.         Thought Content: Thought content normal.         Judgment: Judgment normal.           CRANIAL NERVES     CN III, IV, VI   Pupils are equal, round, and reactive to light.       Significant Labs:   CBC:   Recent Labs   Lab 11/15/20  2133   WBC 12.83*   HGB 14.9   HCT 43.9        CMP:   Recent Labs   Lab 11/15/20  2133      K 3.5      CO2 26   *   BUN 11   CREATININE 1.1   CALCIUM 9.0   PROT 6.9   ALBUMIN 4.0   BILITOT 0.8   ALKPHOS 52*   AST 27   ALT 31   ANIONGAP 10   EGFRNONAA >60.0       Significant Imaging: I have reviewed and interpreted all pertinent imaging results/findings within the past 24 hours.    Assessment/Plan:     Community acquired pneumonia  Med/surg admission; supplemental oxygen, nebs prn, Rocephin 1 gm q day; cultured        VTE Risk Mitigation (From admission, onward)    None             Delroy Jiang MD  Department of Hospital Medicine   [facility]

## 2020-11-16 NOTE — RESPIRATORY THERAPY
This note also relates to the following rows which could not be included:  BP - Cannot attach notes to unvalidated device data       11/16/20 0100   Patient Assessment/Suction   Level of Consciousness (AVPU) alert   Respiratory Effort Unlabored   Expansion/Accessory Muscles/Retractions expansion symmetric;no retractions;no use of accessory muscles   All Lung Fields Breath Sounds diminished   Rhythm/Pattern, Respiratory pattern regular;unlabored   Cough Type dry;good;nonproductive   PRE-TX-O2   O2 Device (Oxygen Therapy) room air   SpO2 97 %   Pulse Oximetry Type Intermittent   $ Pulse Oximetry - Multiple Charge Pulse Oximetry - Multiple   Pulse 103   Resp 18   Aerosol Therapy   $ Aerosol Therapy Charges Aerosol Treatment   Daily Review of Necessity (SVN) completed   Respiratory Treatment Status (SVN) given;mouth rinsed post treatment   Treatment Route (SVN) air;mouthpiece   Post Treatment Assessment (SVN) increased aeration;patient reports breathing improved   Signs of Intolerance (SVN) none   Breath Sounds Post-Respiratory Treatment   Throughout All Fields Post-Treatment All Fields   Throughout All Fields Post-Treatment aeration increased   Post-treatment Heart Rate (beats/min) 114   Post-treatment Resp Rate (breaths/min) 18   Respiratory Interventions   Cough And Deep Breathing done with encouragement   Breathing Techniques/Airway Clearance deep/controlled cough encouraged;diaphragmatic breathing promoted   Education   $ Education Bronchodilator;Other (see comment);15 min  (DEEP DIAPHRAGMATIC BREATHING EXERCISES)   Respiratory Evaluation   $ Care Plan Tech Time 15 min   Evaluation For New Orders

## 2020-11-16 NOTE — PLAN OF CARE
Pt lives with spouse and children and will have no discharge needs     RX= [facility]  [place]  PCP= Rodriguez Tang     11/16/20 6740   Discharge Assessment   Assessment Type Discharge Planning Assessment   Confirmed/corrected address and phone number on facesheet? Yes   Assessment information obtained from? Patient;Medical Record;Caregiver   Communicated expected length of stay with patient/caregiver no   Prior to hospitilization cognitive status: Alert/Oriented   Prior to hospitalization functional status: Independent   Current cognitive status: Alert/Oriented   Current Functional Status: Independent   Facility Arrived From: home   Lives With child(regina), dependent;spouse   Able to Return to Prior Arrangements yes   Is patient able to care for self after discharge? Yes   Who are your caregiver(s) and their phone number(s)? Jewell briseno [phone]   Patient's perception of discharge disposition home or selfcare   Readmission Within the Last 30 Days no previous admission in last 30 days   Patient currently being followed by outpatient case management? No   Patient currently receives any other outside agency services? No   Equipment Currently Used at Home none   Do you have any problems affording any of your prescribed medications? No   Is the patient taking medications as prescribed? yes   Does the patient have transportation home? Yes   Transportation Anticipated car, drives self;family or friend will provide   Dialysis Name and Scheduled days n/a   Does the patient receive services at the [facility]madin Clinic? No   Discharge Plan A Home   Discharge Plan B Home with family   DME Needed Upon Discharge  none   Patient/Family in Agreement with Plan yes

## 2020-11-16 NOTE — PROGRESS NOTES
[facility] Medicine  Progress Note    Patient Name: John Ball  MRN: [id number]  Patient Class: IP- Inpatient   Admission Date: 11/15/2020  Length of Stay: 0 days  Attending Physician: Delroy Christensen MD  Primary Care Provider: Rodriguez Tang MD        Subjective:     Principal Problem:<principal problem not specified>        HPI:  45 year old male (Hx hyperlipidemia) presented to ED complaining of 102.1 fever, and generalized malaise and fatigue. No known sick contacts. Is status post traumatic splenectomy. Was warned by ID to present to a facility if ever febrile with respiratory illness. Has dry cough. No CP/SOB. In ED: labs revealed  Mild leukocytosis with normal hematocrit; normal electrolytes and renal function. COVID and influenza negative. CXR: no infiltrate. His oxygen saturation was reportedly 92% (not noted in chart) improving to 95% with supplemental oxygen. He has had tachycardia since presentation 110's (sinus). Discussed with ED provider, early pneumonia may not show infiltrate yet on CXR. Tmax in ED: 100.4    Overview/Hospital Course:  11/16/2020  Pt continues to have fevers and medication is not helping it at present. He denies headache or stiff neck. Pt does have horses and cows requiring him to go in the woods.    Interval History: Pt has fevers that have been poorly responsive to medication. I am concerned Re tick borne illnesses    Review of Systems   Constitutional: Positive for appetite change, chills, diaphoresis, fatigue and fever.   HENT: Negative.    Eyes: Negative.    Respiratory: Negative.    Cardiovascular: Positive for palpitations.   Gastrointestinal: Negative.    Endocrine: Positive for heat intolerance.   Genitourinary: Negative.    Musculoskeletal: Positive for arthralgias and myalgias. Negative for gait problem and neck stiffness.   Skin: Negative.  Negative for rash.   Allergic/Immunologic: Positive for immunocompromised state.        Splenectomy    Neurological: Positive for weakness.   Hematological: Bruises/bleeds easily.   Psychiatric/Behavioral: The patient is nervous/anxious.      Objective:     Vital Signs (Most Recent):  Temp: 100.1 °F (37.8 °C) (11/16/20 1400)  Pulse: 109 (11/16/20 1400)  Resp: 17 (11/16/20 1100)  BP: (!) 119/57 (11/16/20 1100)  SpO2: (!) 94 % (11/16/20 1400) Vital Signs (24h Range):  Temp:  [99.1 °F (37.3 °C)-100.8 °F (38.2 °C)] 100.1 °F (37.8 °C)  Pulse:  [103-133] 109  Resp:  [17-20] 17  SpO2:  [93 %-100 %] 94 %  BP: (118-142)/(57-77) 119/57     Weight: 92.4 kg (203 lb 11.3 oz)  Body mass index is 31.9 kg/m².    Intake/Output Summary (Last 24 hours) at 11/16/2020 1505  Last data filed at 11/16/2020 0036  Gross per 24 hour   Intake 2100 ml   Output --   Net 2100 ml      Physical Exam  Vitals signs and nursing note reviewed.   Constitutional:       General: He is not in acute distress.     Appearance: Normal appearance. He is ill-appearing and diaphoretic.   HENT:      Head: Normocephalic and atraumatic.      Nose: Nose normal.      Mouth/Throat:      Mouth: Mucous membranes are moist.   Eyes:      Extraocular Movements: Extraocular movements intact.      Pupils: Pupils are equal, round, and reactive to light.   Neck:      Musculoskeletal: Normal range of motion and neck supple.   Cardiovascular:      Rate and Rhythm: Normal rate.      Heart sounds: Gallop present.    Pulmonary:      Breath sounds: Rales present.   Chest:      Chest wall: Tenderness present.   Abdominal:      General: There is no distension.      Tenderness: There is no abdominal tenderness. There is no guarding.   Musculoskeletal: Normal range of motion.   Skin:     General: Skin is warm.   Neurological:      General: No focal deficit present.      Mental Status: He is alert and oriented to person, place, and time.   Psychiatric:      Comments: anxious         Significant Labs:   Recent Lab Results       11/16/20  1148   11/16/20  0732   11/16/20  0515    11/16/20  0218   11/16/20  0000        Influenza A, Molecular               Influenza B, Molecular               Albumin               Alkaline Phosphatase               ALT               Anion Gap     8         Appearance, UA               AST               Baso #     0.04         Basophil %     0.3         Bilirubin (UA)               BILIRUBIN TOTAL               Blood Culture, Routine       No Growth to date[P] No Growth to date[P]     BUN     9         Calcium     8.4         Chloride     108         CO2     23         Color, UA               Creatinine     0.9         Differential Method     Automated         eGFR if      >60.0         eGFR if non      >60.0  Comment:  Calculation used to obtain the estimated glomerular filtration  rate (eGFR) is the CKD-EPI equation.            Eos #     0.0         Eosinophil %     0.1         Flu A & B Source               Glucose     104         Glucose, UA               Gran # (ANC)     9.8         Gran %     74.2         Hematocrit     40.3         Hemoglobin     13.7         HIV Rapid Testing     Negative  Comment:  This testing is for screening purposes only. Postive results   indicate a presumptive presence of HIV-1 and/or HIV-2 antibodies.  Confirmatory test HIV 1/2 Antibody Differentiation by Multispot  will be ordered by the laboratory. Additonal testing is to be   ordered at the discretion of the provider.           Immature Grans (Abs)     0.05  Comment:  Mild elevation in immature granulocytes is non specific and   can be seen in a variety of conditions including stress response,   acute inflammation, trauma and pregnancy. Correlation with other   laboratory and clinical findings is essential.           Immature Granulocytes     0.4         Ketones, UA               Leukocytes, UA               Lymph #     1.0         Lymph %     7.8         MCH     31.7         MCHC     34.0         MCV     93         Mono #     2.3         Mono  %     17.2         MPV     10.1         NITRITE UA               nRBC     0         Occult Blood UA               pH, UA               Platelets     329         POC Glucose 103 101           Potassium     3.6         PROTEIN TOTAL               Protein, UA               RBC     4.32         RDW     14.7         SARS-CoV-2 RNA, Amplification, Qual               Sodium     139         Specific [place], UA               Specimen UA               UROBILINOGEN UA               WBC     13.23                          11/15/20  2151   11/15/20  2148   11/15/20  2133        Influenza A, Molecular   Negative       Influenza B, Molecular   Negative       Albumin     4.0     Alkaline Phosphatase     52     ALT     31     Anion Gap     10     Appearance, UA Clear         AST     27     Baso #     0.04     Basophil %     0.3     Bilirubin (UA) Negative         BILIRUBIN TOTAL     0.8  Comment:  For infants and newborns, interpretation of results should be based  on gestational age, weight and in agreement with clinical  observations.  Premature Infant recommended reference ranges:  Up to 24 hours.............<8.0 mg/dL  Up to 48 hours............<12.0 mg/dL  3-5 days..................<15.0 mg/dL  6-29 days.................<15.0 mg/dL       Blood Culture, Routine           BUN     11     Calcium     9.0     Chloride     100     CO2     26     Color, UA Yellow         Creatinine     1.1     Differential Method     Automated     eGFR if      >60.0     eGFR if non      >60.0  Comment:  Calculation used to obtain the estimated glomerular filtration  rate (eGFR) is the CKD-EPI equation.        Eos #     0.1     Eosinophil %     0.5     Flu A & B Source   Nasal Swab       Glucose     112     Glucose, UA Negative         Gran # (ANC)     10.0     Gran %     78.0     Hematocrit     43.9     Hemoglobin     14.9     HIV Rapid Testing           Immature Grans (Abs)     0.06  Comment:  Mild elevation in  immature granulocytes is non specific and   can be seen in a variety of conditions including stress response,   acute inflammation, trauma and pregnancy. Correlation with other   laboratory and clinical findings is essential.       Immature Granulocytes     0.5     Ketones, UA Negative         Leukocytes, UA Negative         Lymph #     1.0     Lymph %     7.9     MCH     32.0     MCHC     33.9     MCV     94     Mono #     1.6     Mono %     12.8     MPV     10.3     NITRITE UA Negative         nRBC     0     Occult Blood UA Negative         pH, UA 7.0         Platelets     345     POC Glucose           Potassium     3.5     PROTEIN TOTAL     6.9     Protein, UA Negative  Comment:  Recommend a 24 hour urine protein or a urine   protein/creatinine ratio if globulin induced proteinuria is  clinically suspected.           RBC     4.65     RDW     14.6     SARS-CoV-2 RNA, Amplification, Qual   Negative  Comment:  This test utilizes isothermal nucleic acid amplification   technology to detect the SARS-CoV-2 RdRp nucleic acid segment.   The analytical sensitivity (limit of detection) is 125 genome   equivalents/mL.   A POSITIVE result implies infection with the SARS-CoV-2 virus;  the patient is presumed to be contagious.    A NEGATIVE result means that SARS-CoV-2 nucleic acids are not  present above the limit of detection. A NEGATIVE result should be   treated as presumptive. It does not rule out the possibility of   COVID-19 and should not be the sole basis for treatment decisions.   If COVID-19 is strongly suspected based on clinical and exposure   history, re-testing using an alternate molecular assay should be   considered.   This test is only for use under the Food and Drug   Administration s Emergency Use Authorization (EUA).   Commercial kits are provided by [employer].   Performance characteristics of the EUA have been independently  verified by [facility] Department of  Pathology and  Laboratory Medicine.   _________________________________________________________________  The ID NOW COVID-19 Letter of Authorization, along with the   authorized Fact Sheet for Healthcare Providers, the authorized Fact  Sheet for Patients, and authorized labeling are available on the FDA   website:  www.fda.gov/MedicalDevices/Safety/EmergencySituations/fys141673.htm         Sodium     136     Specific [place], UA 1.010         Specimen UA Clean Catch         UROBILINOGEN UA Negative         WBC     12.83           Significant Imaging: I have reviewed all pertinent imaging results/findings within the past 24 hours.      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  P)  Increase tylenol to 1 Gram  Continue present therapy  Dr Caruso consulted      11Community acquired pneumonia  Med/surg admission; supplemental oxygen, nebs prn, Rocephin 1 gm q day; cultured        VTE Risk Mitigation (From admission, onward)         Ordered     IP VTE LOW RISK PATIENT  Once      11/16/20 0312     Place DANIEL hose  Until discontinued      11/16/20 0312                Discharge Planning   BRISSA:      Code Status: Full Code   Is the patient medically ready for discharge?:     Reason for patient still in hospital (select all that apply): Patient new problem, Treatment and Consult recommendations  Discharge Plan A: Home                  Delroy Christensen MD  Department of Hospital Medicine   [facility]

## 2020-11-16 NOTE — RESPIRATORY THERAPY
11/16/20 0109   Patient Assessment/Suction   Level of Consciousness (AVPU) alert   Respiratory Effort Unlabored   Expansion/Accessory Muscles/Retractions expansion symmetric;no retractions;no use of accessory muscles   All Lung Fields Breath Sounds diminished;clear   Rhythm/Pattern, Respiratory pattern regular;unlabored   PRE-TX-O2   O2 Device (Oxygen Therapy) room air   SpO2 97 %   Pulse (!) 114   Resp 18   Aerosol Therapy   $ Aerosol Therapy Charges Aerosol Treatment   Daily Review of Necessity (SVN) completed   Respiratory Treatment Status (SVN) given;mouth rinsed post treatment   Treatment Route (SVN) air;mouthpiece   Patient Position (SVN) Matos's   Post Treatment Assessment (SVN) increased aeration   Signs of Intolerance (SVN) none   Respiratory Evaluation   $ Care Plan Tech Time 15 min   Evaluation For   (care plan)   Home Oxygen   Has Home Oxygen? No   Home Aerosol, MDI, DPI, and Other Treatments/Therapies   Home Respiratory Therapy Per Patient/Review of Chart No

## 2020-11-16 NOTE — SUBJECTIVE & OBJECTIVE
Interval History: Pt has fevers that have been poorly responsive to medication. I am concerned Re tick borne illnesses    Review of Systems   Constitutional: Positive for appetite change, chills, diaphoresis, fatigue and fever.   HENT: Negative.    Eyes: Negative.    Respiratory: Negative.    Cardiovascular: Positive for palpitations.   Gastrointestinal: Negative.    Endocrine: Positive for heat intolerance.   Genitourinary: Negative.    Musculoskeletal: Positive for arthralgias and myalgias. Negative for gait problem and neck stiffness.   Skin: Negative.  Negative for rash.   Allergic/Immunologic: Positive for immunocompromised state.        Splenectomy   Neurological: Positive for weakness.   Hematological: Bruises/bleeds easily.   Psychiatric/Behavioral: The patient is nervous/anxious.      Objective:     Vital Signs (Most Recent):  Temp: 100.1 °F (37.8 °C) (11/16/20 1400)  Pulse: 109 (11/16/20 1400)  Resp: 17 (11/16/20 1100)  BP: (!) 119/57 (11/16/20 1100)  SpO2: (!) 94 % (11/16/20 1400) Vital Signs (24h Range):  Temp:  [99.1 °F (37.3 °C)-100.8 °F (38.2 °C)] 100.1 °F (37.8 °C)  Pulse:  [103-133] 109  Resp:  [17-20] 17  SpO2:  [93 %-100 %] 94 %  BP: (118-142)/(57-77) 119/57     Weight: 92.4 kg (203 lb 11.3 oz)  Body mass index is 31.9 kg/m².    Intake/Output Summary (Last 24 hours) at 11/16/2020 1505  Last data filed at 11/16/2020 0036  Gross per 24 hour   Intake 2100 ml   Output --   Net 2100 ml      Physical Exam  Vitals signs and nursing note reviewed.   Constitutional:       General: He is not in acute distress.     Appearance: Normal appearance. He is ill-appearing and diaphoretic.   HENT:      Head: Normocephalic and atraumatic.      Nose: Nose normal.      Mouth/Throat:      Mouth: Mucous membranes are moist.   Eyes:      Extraocular Movements: Extraocular movements intact.      Pupils: Pupils are equal, round, and reactive to light.   Neck:      Musculoskeletal: Normal range of motion and neck supple.    Cardiovascular:      Rate and Rhythm: Normal rate.      Heart sounds: Gallop present.    Pulmonary:      Breath sounds: Rales present.   Chest:      Chest wall: Tenderness present.   Abdominal:      General: There is no distension.      Tenderness: There is no abdominal tenderness. There is no guarding.   Musculoskeletal: Normal range of motion.   Skin:     General: Skin is warm.   Neurological:      General: No focal deficit present.      Mental Status: He is alert and oriented to person, place, and time.   Psychiatric:      Comments: anxious         Significant Labs:   Recent Lab Results       11/16/20  1148   11/16/20  0732   11/16/20  0515   11/16/20  0218   11/16/20  0000        Influenza A, Molecular               Influenza B, Molecular               Albumin               Alkaline Phosphatase               ALT               Anion Gap     8         Appearance, UA               AST               Baso #     0.04         Basophil %     0.3         Bilirubin (UA)               BILIRUBIN TOTAL               Blood Culture, Routine       No Growth to date[P] No Growth to date[P]     BUN     9         Calcium     8.4         Chloride     108         CO2     23         Color, UA               Creatinine     0.9         Differential Method     Automated         eGFR if      >60.0         eGFR if non      >60.0  Comment:  Calculation used to obtain the estimated glomerular filtration  rate (eGFR) is the CKD-EPI equation.            Eos #     0.0         Eosinophil %     0.1         Flu A & B Source               Glucose     104         Glucose, UA               Gran # (ANC)     9.8         Gran %     74.2         Hematocrit     40.3         Hemoglobin     13.7         HIV Rapid Testing     Negative  Comment:  This testing is for screening purposes only. Postive results   indicate a presumptive presence of HIV-1 and/or HIV-2 antibodies.  Confirmatory test HIV 1/2 Antibody Differentiation  by Multispot  will be ordered by the laboratory. Additonal testing is to be   ordered at the discretion of the provider.           Immature Grans (Abs)     0.05  Comment:  Mild elevation in immature granulocytes is non specific and   can be seen in a variety of conditions including stress response,   acute inflammation, trauma and pregnancy. Correlation with other   laboratory and clinical findings is essential.           Immature Granulocytes     0.4         Ketones, UA               Leukocytes, UA               Lymph #     1.0         Lymph %     7.8         MCH     31.7         MCHC     34.0         MCV     93         Mono #     2.3         Mono %     17.2         MPV     10.1         NITRITE UA               nRBC     0         Occult Blood UA               pH, UA               Platelets     329         POC Glucose 103 101           Potassium     3.6         PROTEIN TOTAL               Protein, UA               RBC     4.32         RDW     14.7         SARS-CoV-2 RNA, Amplification, Qual               Sodium     139         Specific [place], UA               Specimen UA               UROBILINOGEN UA               WBC     13.23                          11/15/20  2151   11/15/20  2148   11/15/20  2133        Influenza A, Molecular   Negative       Influenza B, Molecular   Negative       Albumin     4.0     Alkaline Phosphatase     52     ALT     31     Anion Gap     10     Appearance, UA Clear         AST     27     Baso #     0.04     Basophil %     0.3     Bilirubin (UA) Negative         BILIRUBIN TOTAL     0.8  Comment:  For infants and newborns, interpretation of results should be based  on gestational age, weight and in agreement with clinical  observations.  Premature Infant recommended reference ranges:  Up to 24 hours.............<8.0 mg/dL  Up to 48 hours............<12.0 mg/dL  3-5 days..................<15.0 mg/dL  6-29 days.................<15.0 mg/dL       Blood Culture, Routine           BUN     11      Calcium     9.0     Chloride     100     CO2     26     Color, UA Yellow         Creatinine     1.1     Differential Method     Automated     eGFR if      >60.0     eGFR if non      >60.0  Comment:  Calculation used to obtain the estimated glomerular filtration  rate (eGFR) is the CKD-EPI equation.        Eos #     0.1     Eosinophil %     0.5     Flu A & B Source   Nasal Swab       Glucose     112     Glucose, UA Negative         Gran # (ANC)     10.0     Gran %     78.0     Hematocrit     43.9     Hemoglobin     14.9     HIV Rapid Testing           Immature Grans (Abs)     0.06  Comment:  Mild elevation in immature granulocytes is non specific and   can be seen in a variety of conditions including stress response,   acute inflammation, trauma and pregnancy. Correlation with other   laboratory and clinical findings is essential.       Immature Granulocytes     0.5     Ketones, UA Negative         Leukocytes, UA Negative         Lymph #     1.0     Lymph %     7.9     MCH     32.0     MCHC     33.9     MCV     94     Mono #     1.6     Mono %     12.8     MPV     10.3     NITRITE UA Negative         nRBC     0     Occult Blood UA Negative         pH, UA 7.0         Platelets     345     POC Glucose           Potassium     3.5     PROTEIN TOTAL     6.9     Protein, UA Negative  Comment:  Recommend a 24 hour urine protein or a urine   protein/creatinine ratio if globulin induced proteinuria is  clinically suspected.           RBC     4.65     RDW     14.6     SARS-CoV-2 RNA, Amplification, Qual   Negative  Comment:  This test utilizes isothermal nucleic acid amplification   technology to detect the SARS-CoV-2 RdRp nucleic acid segment.   The analytical sensitivity (limit of detection) is 125 genome   equivalents/mL.   A POSITIVE result implies infection with the SARS-CoV-2 virus;  the patient is presumed to be contagious.    A NEGATIVE result means that SARS-CoV-2 nucleic acids are  not  present above the limit of detection. A NEGATIVE result should be   treated as presumptive. It does not rule out the possibility of   COVID-19 and should not be the sole basis for treatment decisions.   If COVID-19 is strongly suspected based on clinical and exposure   history, re-testing using an alternate molecular assay should be   considered.   This test is only for use under the Food and Drug   Administration s Emergency Use Authorization (EUA).   Commercial kits are provided by [employer].   Performance characteristics of the EUA have been independently  verified by [facility] Department of  Pathology and Laboratory Medicine.   _________________________________________________________________  The ID NOW COVID-19 Letter of Authorization, along with the   authorized Fact Sheet for Healthcare Providers, the authorized Fact  Sheet for Patients, and authorized labeling are available on the FDA   website:  www.fda.gov/MedicalDevices/Safety/EmergencySituations/hyd943283.htm         Sodium     136     Specific [place], UA 1.010         Specimen UA Clean Catch         UROBILINOGEN UA Negative         WBC     12.83           Significant Imaging: I have reviewed all pertinent imaging results/findings within the past 24 hours.

## 2020-11-16 NOTE — SUBJECTIVE & OBJECTIVE
History reviewed. No pertinent past medical history.    Past Surgical History:   Procedure Laterality Date    APPENDECTOMY      SINUS SURGERY      SPLENECTOMY, TOTAL      TONSILLECTOMY         Review of patient's allergies indicates:  No Known Allergies    No current facility-administered medications on file prior to encounter.      Current Outpatient Medications on File Prior to Encounter   Medication Sig    aspirin (ECOTRIN) 81 MG EC tablet Take 81 mg by mouth once daily.    multivitamin (MULTIVITAMIN) per tablet Take 1 tablet by mouth once daily.      oxyCODONE-acetaminophen (PERCOCET)  mg per tablet Take 1 tablet by mouth every 4 (four) hours as needed for Pain.     Family History     None        Tobacco Use    Smoking status: Former Smoker     Packs/day: 0.25     Years: 0.50     Pack years: 0.12     Quit date: 1997     Years since quittin.5    Smokeless tobacco: Never Used   Substance and Sexual Activity    Alcohol use: Yes     Comment: social     Drug use: Yes    Sexual activity: Yes     Partners: Female     Birth control/protection: None     Review of Systems   Constitutional: Positive for chills, fatigue and fever.   HENT: Negative.    Eyes: Negative.    Respiratory: Positive for cough.    Cardiovascular: Negative.    Gastrointestinal: Negative.    Endocrine: Negative.    Genitourinary: Negative.    Musculoskeletal: Negative.    Skin: Negative.    Allergic/Immunologic: Negative.    Neurological: Negative.    Hematological: Negative.    All other systems reviewed and are negative.    Objective:     Vital Signs (Most Recent):  Temp: (!) 100.4 °F (38 °C) (20 0220)  Pulse: (!) 114 (20 010)  Resp: 18 (20 010)  BP: 128/67 (20 0100)  SpO2: 98 % (20 0115) Vital Signs (24h Range):  Temp:  [99.1 °F (37.3 °C)-100.4 °F (38 °C)] 100.4 °F (38 °C)  Pulse:  [103-133] 114  Resp:  [17-20] 18  SpO2:  [95 %-98 %] 98 %  BP: (118-142)/(66-77) 128/67     Weight: 88.5 kg  (195 lb)  Body mass index is 27.98 kg/m².    Physical Exam  Vitals signs and nursing note reviewed.   Constitutional:       Appearance: He is well-developed.   HENT:      Head: Normocephalic and atraumatic.      Right Ear: External ear normal.      Left Ear: External ear normal.      Nose: Nose normal.   Eyes:      Conjunctiva/sclera: Conjunctivae normal.      Pupils: Pupils are equal, round, and reactive to light.   Neck:      Musculoskeletal: Normal range of motion and neck supple.   Cardiovascular:      Rate and Rhythm: Normal rate and regular rhythm.      Heart sounds: Normal heart sounds.   Pulmonary:      Effort: Pulmonary effort is normal.      Breath sounds: Normal breath sounds.      Comments: Decreased entry with large airway sounds clearing; no wheeze (just had nebs) or opening crepitations  Abdominal:      General: Bowel sounds are normal.      Palpations: Abdomen is soft.   Musculoskeletal: Normal range of motion.   Skin:     General: Skin is warm and dry.      Capillary Refill: Capillary refill takes less than 2 seconds.   Neurological:      Mental Status: He is alert and oriented to person, place, and time.   Psychiatric:         Behavior: Behavior normal.         Thought Content: Thought content normal.         Judgment: Judgment normal.           CRANIAL NERVES     CN III, IV, VI   Pupils are equal, round, and reactive to light.       Significant Labs:   CBC:   Recent Labs   Lab 11/15/20  2133   WBC 12.83*   HGB 14.9   HCT 43.9        CMP:   Recent Labs   Lab 11/15/20  2133      K 3.5      CO2 26   *   BUN 11   CREATININE 1.1   CALCIUM 9.0   PROT 6.9   ALBUMIN 4.0   BILITOT 0.8   ALKPHOS 52*   AST 27   ALT 31   ANIONGAP 10   EGFRNONAA >60.0       Significant Imaging: I have reviewed and interpreted all pertinent imaging results/findings within the past 24 hours.

## 2020-11-16 NOTE — HPI
45 year old male (Hx hyperlipidemia) presented to ED complaining of 102.1 fever, and generalized malaise and fatigue. No known sick contacts. Is status post traumatic splenectomy. Was warned by ID to present to a facility if ever febrile with respiratory illness. Has dry cough. No CP/SOB. In ED: labs revealed  Mild leukocytosis with normal hematocrit; normal electrolytes and renal function. COVID and influenza negative. CXR: no infiltrate. His oxygen saturation was reportedly 92% (not noted in chart) improving to 95% with supplemental oxygen. He has had tachycardia since presentation 110's (sinus). Discussed with ED provider, early pneumonia may not show infiltrate yet on CXR. Tmax in ED: 100.4

## 2020-11-16 NOTE — ED PROVIDER NOTES
Encounter Date: 11/15/2020       History     Chief Complaint   Patient presents with    Fever     TMAX (102.8 - took 800mg ibuprofen PTA). Denies cough, dysuria, n/v/d, or CP/SOB. Spleenectomy  - was told to come to the ED if spiked a temperature per Dr. Miryam Caruso. Took 750mg of levofloxacin.      HPI     Seen and evaluated.  Patient presented with a chief complaint of febrile illness.  He notes he had a previous splenectomy.  He had taken a single dose of Levaquin prior to arrival.  Here today, he notes chills and fever.  He also has who associated shortness of breath.  He has no associated nausea or vomiting.  Denies any sick contacts.  Today, patient was about to go to [employer] when he noted he did not feel well.  This was followed by checking his temperature and finding of the was febrile.  Symptoms are moderate.  In acute exacerbation.  Denies dysuria, vomiting, or additional complaints    Review of patient's allergies indicates:  No Known Allergies  No past medical history on file.  Past Surgical History:   Procedure Laterality Date    APPENDECTOMY      SINUS SURGERY      SPLENECTOMY, TOTAL      TONSILLECTOMY       No family history on file.  Social History     Tobacco Use    Smoking status: Former Smoker     Packs/day: 0.25     Years: 0.50     Pack years: 0.12     Quit date: 1997     Years since quittin.5    Smokeless tobacco: Never Used   Substance Use Topics    Alcohol use: Yes     Comment: social     Drug use: Yes     Review of Systems   Constitutional: Positive for chills and fever.   HENT: Negative for sore throat.    Respiratory: Positive for shortness of breath.    Cardiovascular: Negative for chest pain.   Gastrointestinal: Negative for nausea.   Genitourinary: Negative for dysuria.   Musculoskeletal: Negative for back pain.   Skin: Negative for rash.   Neurological: Negative for weakness.   Hematological: Does not bruise/bleed easily.   All other systems reviewed and are  negative.      Physical Exam     Initial Vitals [11/15/20 2106]   BP Pulse Resp Temp SpO2   (!) 142/67 (!) 133 20 100.2 °F (37.9 °C) 95 %      MAP       --         Physical Exam    Constitutional: He appears well-developed and well-nourished.   HENT:   Head: Normocephalic and atraumatic.   Eyes: Conjunctivae are normal.   Cardiovascular: Normal rate and regular rhythm.   Abdominal: Soft. Normal appearance.   Musculoskeletal: Normal range of motion.   Neurological: He is alert and oriented to person, place, and time.   Skin: Skin is warm and dry.   Psychiatric: He has a normal mood and affect. His speech is normal.         ED Course   Procedures  Labs Reviewed   CBC W/ AUTO DIFFERENTIAL - Abnormal; Notable for the following components:       Result Value    WBC 12.83 (*)     MCH 32.0 (*)     RDW 14.6 (*)     Gran # (ANC) 10.0 (*)     Immature Grans (Abs) 0.06 (*)     Mono # 1.6 (*)     Gran % 78.0 (*)     Lymph % 7.9 (*)     All other components within normal limits   COMPREHENSIVE METABOLIC PANEL - Abnormal; Notable for the following components:    Glucose 112 (*)     Alkaline Phosphatase 52 (*)     All other components within normal limits   CULTURE, BLOOD   CULTURE, BLOOD   SARS-COV-2 RNA AMPLIFICATION, QUAL   URINALYSIS   INFLUENZA A AND B ANTIGEN    Narrative:     Specimen Source->Nasopharyngeal Swab          Imaging Results          X-Ray Chest AP Portable (In process)    Procedure changed from X-Ray Chest 1 View                  Medical Decision Making:   Initial Assessment:   Seen and evaluated.  Nontoxic.  Laboratory evaluation stable.  Abnormal vitals, with noted tachycardia.  Additionally, patient's pulse ox ranging between 92 and 98.  This alone is not concerning, but given his presentation, worried that this could represent a reactive airway disease process.  Also, given his previous splenectomy, I have covered him broadly with antibiotics.  He did receive Rocephin in the emergency department.  He  remained tachycardic throughout his stay, after his fever had broken as well.  Additionally,  after a DuoNeb the improvement that had previously been present worsened..  His care was discussed with hospital medicine service, and they will admit patient for further evaluation and treatment.  He has received a dose of Rocephin prior to admission.                             Clinical Impression:       ICD-10-CM ICD-9-CM   1. Fever, unspecified fever cause  R50.9 780.60   2. Tachycardia  R00.0 785.0   3. Fever  R50.9 780.60                          ED Disposition Condition    Admit                             Daniel Godwin Jr., MD  11/16/20 0218

## 2020-11-16 NOTE — PLAN OF CARE
11/16/20 0951   Patient Assessment/Suction   Level of Consciousness (AVPU) alert   All Lung Fields Breath Sounds diminished   PRE-TX-O2   O2 Device (Oxygen Therapy) room air   SpO2 98 %   Pulse Oximetry Type Intermittent   $ Pulse Oximetry - Multiple Charge Pulse Oximetry - Multiple   Pulse (!) 113   Temp (!) 100.4 °F (38 °C)   Aerosol Therapy   $ Aerosol Therapy Charges PRN treatment not required   Respiratory Evaluation   $ Care Plan Tech Time 15 min

## 2020-11-16 NOTE — HOSPITAL COURSE
11/16/2020  Pt continues to have fevers and medication is not helping it at present. He denies headache or stiff neck. Pt does have horses and cows requiring him to go in the [place].    Aamir assumed care on 11/17/20. Chart reviewed.  History of splenectomy, horse accident with rupture.  States initially developed headache, took 800 mg of ibuprofen with improvement, later in the day feeling generally unwell with malaise and then fever 101.  Given prior history and instructions, he took Levaquin and presented to the ED. Fever 102.8.  Admitted with Rocephin and azithromycin with Infectious Disease consultation and workup.  On 11/16 still with fever, azithromycin switched doxycycline, COVID send out test ordered.  On 11/17, fever yesterday to 101, intermittent shortness of breath especially with deep breaths, states he was told [employer] member has now tested positive for COVID, admits he was not wearing his mask in [employer].  Following discussions with Infectious Disease given suspicion for COVID-19, he was empirically started on RDV (S received 3 doses in total), sent out COVID-19 was also negative.  On 11/18 patient with continued fever of 102.8 overnight, associated with shivering, during the daytime temperature is better, appears comfortable, no new symptoms however states about 1 week ago did have tingling sensation in his left air, othoscope examination was unremarkable, mild bloody nasal discharge which he feels is due to COVID swab testing.  On 11/19 has remained afebrile and clinically feels well, communicated with Infectious Disease, cleared for discharge with recommendations for 14 days of doxycycline, continued self quarantining for 7 days with COVID 19 IgG in 3 weeks.  Discharge instructions medication changes, follow-up as well as strict return precautions were explained.  No objection to discharge.    Discharge examination  Well-appearing, lying in bed no apparent distress, alert and oriented, not on  supplemental oxygen with good air entry, regular heart rhythm    Discharge recommendations  Continue to self quarantine for 7 days  14 days course of doxycycline, advice to take upright with large glass of water  Blood tests for COVID IgG in 3 weeks time, results to Dr. Caruso  Work note provided

## 2020-11-17 PROBLEM — D72.829 LEUCOCYTOSIS: Status: ACTIVE | Noted: 2020-11-17

## 2020-11-17 PROBLEM — E78.5 HYPERLIPIDEMIA: Chronic | Status: ACTIVE | Noted: 2020-11-17

## 2020-11-17 PROBLEM — D72.829 LEUCOCYTOSIS: Status: RESOLVED | Noted: 2020-11-17 | Resolved: 2020-11-17

## 2020-11-17 LAB
ABO + RH BLD: NORMAL
ADENOVIRUS: NOT DETECTED
ALBUMIN SERPL BCP-MCNC: 4.2 G/DL (ref 3.5–5.2)
ALP SERPL-CCNC: 52 U/L (ref 55–135)
ALT SERPL W/O P-5'-P-CCNC: 32 U/L (ref 10–44)
ANION GAP SERPL CALC-SCNC: 9 MMOL/L (ref 8–16)
AST SERPL-CCNC: 28 U/L (ref 10–40)
BASOPHILS # BLD AUTO: 0.03 K/UL (ref 0–0.2)
BASOPHILS NFR BLD: 0.2 % (ref 0–1.9)
BILIRUB SERPL-MCNC: 0.8 MG/DL (ref 0.1–1)
BLD GP AB SCN CELLS X3 SERPL QL: NORMAL
BORDETELLA PARAPERTUSSIS (IS1001): NOT DETECTED
BORDETELLA PERTUSSIS (PTXP): NOT DETECTED
BUN SERPL-MCNC: 6 MG/DL (ref 6–20)
CALCIUM SERPL-MCNC: 9.1 MG/DL (ref 8.7–10.5)
CHLAMYDIA PNEUMONIAE: NOT DETECTED
CHLORIDE SERPL-SCNC: 102 MMOL/L (ref 95–110)
CO2 SERPL-SCNC: 24 MMOL/L (ref 23–29)
CORONAVIRUS 229E, COMMON COLD VIRUS: NOT DETECTED
CORONAVIRUS HKU1, COMMON COLD VIRUS: NOT DETECTED
CORONAVIRUS NL63, COMMON COLD VIRUS: NOT DETECTED
CORONAVIRUS OC43, COMMON COLD VIRUS: NOT DETECTED
CREAT SERPL-MCNC: 0.9 MG/DL (ref 0.5–1.4)
CRP SERPL-MCNC: 2.99 MG/DL
D DIMER PPP IA.FEU-MCNC: <0.27 UG/ML FEU
DIFFERENTIAL METHOD: ABNORMAL
EOSINOPHIL # BLD AUTO: 0 K/UL (ref 0–0.5)
EOSINOPHIL NFR BLD: 0 % (ref 0–8)
ERYTHROCYTE [DISTWIDTH] IN BLOOD BY AUTOMATED COUNT: 15.1 % (ref 11.5–14.5)
EST. GFR  (AFRICAN AMERICAN): >60 ML/MIN/1.73 M^2
EST. GFR  (NON AFRICAN AMERICAN): >60 ML/MIN/1.73 M^2
FERRITIN SERPL-MCNC: 165 NG/ML (ref 20–300)
FLUBV RNA NPH QL NAA+NON-PROBE: NOT DETECTED
GLUCOSE SERPL-MCNC: 103 MG/DL (ref 70–110)
HCT VFR BLD AUTO: 48.4 % (ref 40–54)
HGB BLD-MCNC: 16.5 G/DL (ref 14–18)
HPIV1 RNA NPH QL NAA+NON-PROBE: NOT DETECTED
HPIV2 RNA NPH QL NAA+NON-PROBE: NOT DETECTED
HPIV3 RNA NPH QL NAA+NON-PROBE: NOT DETECTED
HPIV4 RNA NPH QL NAA+NON-PROBE: NOT DETECTED
HUMAN METAPNEUMOVIRUS: NOT DETECTED
IMM GRANULOCYTES # BLD AUTO: 0.05 K/UL (ref 0–0.04)
IMM GRANULOCYTES NFR BLD AUTO: 0.4 % (ref 0–0.5)
INFLUENZA A (SUBTYPES H1,H1-2009,H3): NOT DETECTED
LYMPHOCYTES # BLD AUTO: 2.7 K/UL (ref 1–4.8)
LYMPHOCYTES NFR BLD: 21.3 % (ref 18–48)
MCH RBC QN AUTO: 31.7 PG (ref 27–31)
MCHC RBC AUTO-ENTMCNC: 34.1 G/DL (ref 32–36)
MCV RBC AUTO: 93 FL (ref 82–98)
MONOCYTES # BLD AUTO: 1.8 K/UL (ref 0.3–1)
MONOCYTES NFR BLD: 14.2 % (ref 4–15)
MYCOPLASMA PNEUMONIAE: NOT DETECTED
NEUTROPHILS # BLD AUTO: 7.9 K/UL (ref 1.8–7.7)
NEUTROPHILS NFR BLD: 63.9 % (ref 38–73)
NRBC BLD-RTO: 0 /100 WBC
PLATELET # BLD AUTO: 342 K/UL (ref 150–350)
PMV BLD AUTO: 10.5 FL (ref 9.2–12.9)
POTASSIUM SERPL-SCNC: 4.1 MMOL/L (ref 3.5–5.1)
PROCALCITONIN SERPL IA-MCNC: 0.08 NG/ML (ref 0–0.5)
PROT SERPL-MCNC: 7.4 G/DL (ref 6–8.4)
RBC # BLD AUTO: 5.2 M/UL (ref 4.6–6.2)
RESPIRATORY INFECTION PANEL SOURCE: NORMAL
RSV RNA NPH QL NAA+NON-PROBE: NOT DETECTED
RV+EV RNA NPH QL NAA+NON-PROBE: NOT DETECTED
SODIUM SERPL-SCNC: 135 MMOL/L (ref 136–145)
WBC # BLD AUTO: 12.44 K/UL (ref 3.9–12.7)

## 2020-11-17 PROCEDURE — 25000003 PHARM REV CODE 250: Performed by: INTERNAL MEDICINE

## 2020-11-17 PROCEDURE — 85379 FIBRIN DEGRADATION QUANT: CPT

## 2020-11-17 PROCEDURE — 99900031 HC PATIENT EDUCATION (STAT)

## 2020-11-17 PROCEDURE — 99900035 HC TECH TIME PER 15 MIN (STAT)

## 2020-11-17 PROCEDURE — 99232 PR SUBSEQUENT HOSPITAL CARE,LEVL II: ICD-10-PCS | Mod: ,,, | Performed by: INTERNAL MEDICINE

## 2020-11-17 PROCEDURE — 85025 COMPLETE CBC W/AUTO DIFF WBC: CPT

## 2020-11-17 PROCEDURE — 63600175 PHARM REV CODE 636 W HCPCS: Performed by: INTERNAL MEDICINE

## 2020-11-17 PROCEDURE — 94761 N-INVAS EAR/PLS OXIMETRY MLT: CPT

## 2020-11-17 PROCEDURE — 12000002 HC ACUTE/MED SURGE SEMI-PRIVATE ROOM

## 2020-11-17 PROCEDURE — 84145 PROCALCITONIN (PCT): CPT

## 2020-11-17 PROCEDURE — 82728 ASSAY OF FERRITIN: CPT

## 2020-11-17 PROCEDURE — 36415 COLL VENOUS BLD VENIPUNCTURE: CPT

## 2020-11-17 PROCEDURE — 99232 SBSQ HOSP IP/OBS MODERATE 35: CPT | Mod: ,,, | Performed by: INTERNAL MEDICINE

## 2020-11-17 PROCEDURE — 86140 C-REACTIVE PROTEIN: CPT

## 2020-11-17 PROCEDURE — 80053 COMPREHEN METABOLIC PANEL: CPT

## 2020-11-17 PROCEDURE — 86850 RBC ANTIBODY SCREEN: CPT

## 2020-11-17 RX ORDER — ATORVASTATIN CALCIUM 10 MG/1
10 TABLET, FILM COATED ORAL DAILY
Status: DISCONTINUED | OUTPATIENT
Start: 2020-11-18 | End: 2020-11-19 | Stop reason: HOSPADM

## 2020-11-17 RX ORDER — ATORVASTATIN CALCIUM 10 MG/1
10 TABLET, FILM COATED ORAL DAILY
Status: DISCONTINUED | OUTPATIENT
Start: 2020-11-17 | End: 2020-11-17

## 2020-11-17 RX ADMIN — IBUPROFEN 400 MG: 400 TABLET, FILM COATED ORAL at 02:11

## 2020-11-17 RX ADMIN — ACETAMINOPHEN 1000 MG: 500 TABLET, FILM COATED ORAL at 07:11

## 2020-11-17 RX ADMIN — DOXYCYCLINE HYCLATE 100 MG: 100 CAPSULE ORAL at 09:11

## 2020-11-17 RX ADMIN — THERA TABS 1 TABLET: TAB at 09:11

## 2020-11-17 RX ADMIN — REMDESIVIR 200 MG: 100 INJECTION, POWDER, LYOPHILIZED, FOR SOLUTION INTRAVENOUS at 10:11

## 2020-11-17 RX ADMIN — ASPIRIN 81 MG: 81 TABLET, DELAYED RELEASE ORAL at 09:11

## 2020-11-17 RX ADMIN — ONDANSETRON 4 MG: 2 INJECTION INTRAMUSCULAR; INTRAVENOUS at 02:11

## 2020-11-17 RX ADMIN — CEFTRIAXONE 1 G: 1 INJECTION, SOLUTION INTRAVENOUS at 02:11

## 2020-11-17 RX ADMIN — ATORVASTATIN CALCIUM 10 MG: 10 TABLET, FILM COATED ORAL at 09:11

## 2020-11-17 RX ADMIN — IBUPROFEN 400 MG: 400 TABLET, FILM COATED ORAL at 09:11

## 2020-11-17 NOTE — PLAN OF CARE
Problem: Adult Inpatient Plan of Care  Goal: Plan of Care Review  Outcome: Ongoing, Progressing     Problem: Fluid Imbalance (Pneumonia)  Goal: Fluid Balance  Outcome: Ongoing, Progressing

## 2020-11-17 NOTE — PLAN OF CARE
Problem: Adult Inpatient Plan of Care  Goal: Plan of Care Review  Outcome: Ongoing, Progressing  Goal: Patient-Specific Goal (Individualization)  Outcome: Ongoing, Progressing  Goal: Absence of Hospital-Acquired Illness or Injury  Outcome: Ongoing, Progressing  Goal: Optimal Comfort and Wellbeing  Outcome: Ongoing, Progressing     Problem: Infection (Pneumonia)  Goal: Resolution of Infection Signs/Symptoms  Outcome: Ongoing, Progressing

## 2020-11-17 NOTE — ASSESSMENT & PLAN NOTE
There was concern fever and presentation symptomatology consistent with COVID-19.  Rapid PCR negative.  Sent out pending.  Continue isolation precaution

## 2020-11-17 NOTE — CONSULTS
Consult Note  Infectious Disease    Reason for Consult:  Fever and a spleen E a    HPI: John Ball is a  45 y.o. male Known to me from prior inpatient consultation (May 2012 viral meningitis) and an outpatient visit the date of which do not have access to at this time, presented to the emergency room overnight with a maximum temperature of 102.8°.  As per my outpatient instructions for asplenic persons, whenever he had a temperature this high he was to take 750 mg of levofloxacin on the way to the hospital.  He followed my instructions. He began to feel poorly yesterday am with a headache, then malaise, and later in the day his temperature gradually escalated.  In the emergency room he had negative COVID and influenza tests, mild leukocytosis and had a slightly low oxygen saturation of 92%.  He complained of chills, denies shortness of breath and cough, and his exam was described as decreased air entry without wheezing.  He was cultured and placed on ceftriaxone and azithromycin.  He continues to have some temperature elevation, to 101 this afternoon.  Because of his outdoor exposures tick borne illness was mention by Hospital Medicine but he has had no animal contact except with a puppy. No ticks, flea bites, etc.  Blood cultures from admission are negative.  White blood cells are 13,000, platelets are normal, liver function tests are normal.  I reviewed the chest x-ray image which is unremarkable. N osick contacts, or known exposure to COVID. Not completely compliant with masking in public.     Review of patient's allergies indicates:  No Known Allergies  History reviewed. No pertinent past medical history.  Past Surgical History:   Procedure Laterality Date    APPENDECTOMY      SINUS SURGERY      SPLENECTOMY, TOTAL      TONSILLECTOMY      2012"PAST MEDICAL AND SURGICAL HISTORY:  Chronic sinus disease status post sinus   surgery, status post appendectomy, tonsillectomy, status post trauma to his   abdomen  "from falling off a horse resulting in the need for splenectomy.  He did   receive pneumococcal vaccine 10 years ago, but not since then."  Viral meningitis May 2012     Social History     Socioeconomic History    Marital status:      Spouse name: Not on file    Number of children: Not on file    Years of education: Not on file    Highest education level: Not on file   Occupational History    Not on file   Social Needs    Financial resource strain: Not on file    Food insecurity     Worry: Not on file     Inability: Not on file    Transportation needs     Medical: Not on file     Non-medical: Not on file   Tobacco Use    Smoking status: Former Smoker     Packs/day: 0.25     Years: 0.50     Pack years: 0.12     Quit date: 1997     Years since quittin.5    Smokeless tobacco: Never Used   Substance and Sexual Activity    Alcohol use: Yes     Comment: social     Drug use: Yes    Sexual activity: Yes     Partners: Female     Birth control/protection: None   Lifestyle    Physical activity     Days per week: Not on file     Minutes per session: Not on file    Stress: Not on file   Relationships    Social connections     Talks on phone: Not on file     Gets together: Not on file     Attends Shinto service: Not on file     Active member of club or organization: Not on file     Attends meetings of clubs or organizations: Not on file     Relationship status: Not on file   Other Topics Concern    Not on file   Social History Narrative    Not on file     History reviewed. No pertinent family history.    Pertinent medications noted:     Review of Systems:    chills, fever, sweats, flushing  No change in vision, loss of vision or diplopia  Mild frontal sinus congestion, without purulent nasal discharge, with post nasal drip , mild ear and facial stuffiness  No pain in mouth or throat. No problems with teeth, gums.  No chest pain, palpitations, syncope  minimal cough, some mid chest tightness, " nosputum production, shortness of breath, dyspnea on exertion, pleurisy, hemoptysis  No nausea, vomiting, diarrhea, , or focal abd pain,  No dysphagia, odynophagia  No dysuria, hematuria,    No swelling of joints, redness of joints, injuries, or new focal pain  had unusual headaches, without dizziness, vertigo, numbness, paresthesias, neuropathy, falls  No anxiety, depression, substance abuse, sleep disturbance  No diabetes, thyroid, hypogonadal conditions  No bleeding, lymphadenopathy, anemia, malignancy, unusual bruising  No new rashes, lesions, or wounds     No recent/prior steroids  Outdoor activities: none lately  Travel: none  Implants: none  Antibiotic History: none     EXAM & DIAGNOSTICS REVIEWED:   Vitals:     Temp:  [99.1 °F (37.3 °C)-101 °F (38.3 °C)]   Temp: (!) 101 °F (38.3 °C) (11/16/20 1723)  Pulse: 106 (11/16/20 1500)  Resp: 17 (11/16/20 1500)  BP: 121/78 (11/16/20 1500)  SpO2: 96 % (11/16/20 1500)    Intake/Output Summary (Last 24 hours) at 11/16/2020 1817  Last data filed at 11/16/2020 0036  Gross per 24 hour   Intake 2100 ml   Output --   Net 2100 ml       General:  In NAD. Alert and attentive, cooperative, comfortable, very flushed, diaphoretic, non toxic however.   Eyes:  Anicteric, PERRL, EOMI  ENT:  No ulcers, exudates, thrush, nares patent, dentition is excellent  Neck:  supple, no masses or adenopathy appreciated  Lungs: Clear, no consolidation, rales, wheezes, rub  Heart:  RRR, no gallop/murmur/rub noted  Abd:  Soft, NT, ND, normal BS, no masses or organomegaly appreciated.  :  Voids/  no flank tenderness  Musc:  Joints without effusion, swelling, erythema, synovitis, muscle wasting. No spine tenderness  Skin:  No rashes but he is very flushed with fever. No palmar or plantar lesions. No subungual petechiae  Wound:   Neuro:  Alert, attentive, speech fluent, face symmetric, moves all extremities, no focal weakness. Ambulatory  Psych:  Calm, cooperative  Lymphatic:     No cervical,  supraclavicular, axillary, or inguinal nodes  Extrem: No edema, erythema, phlebitis, cellulitis, warm and well perfused  VAD:  peripheral     Isolation:  None but will be COVID    Lines/Tubes/Drains:    General Labs reviewed:  Recent Labs   Lab 11/15/20  2133 11/16/20  0515   WBC 12.83* 13.23*   HGB 14.9 13.7*   HCT 43.9 40.3    329       Recent Labs   Lab 11/15/20  2133 11/16/20  0515    139   K 3.5 3.6    108   CO2 26 23   BUN 11 9   CREATININE 1.1 0.9   CALCIUM 9.0 8.4*   PROT 6.9  --    BILITOT 0.8  --    ALKPHOS 52*  --    ALT 31  --    AST 27  --        Micro:  Microbiology Results (last 7 days)     Procedure Component Value Units Date/Time    Blood Culture #2 **CANNOT BE ORDERED STAT** [[id number]] Collected: 11/16/20 0218    Order Status: Completed Specimen: Blood from Peripheral, Antecubital, Right Updated: 11/16/20 0917     Blood Culture, Routine No Growth to date    Blood Culture #1 **CANNOT BE ORDERED STAT** [[id number]] Collected: 11/16/20 0000    Order Status: Completed Specimen: Blood from Peripheral, Antecubital, Left Updated: 11/16/20 0917     Blood Culture, Routine No Growth to date        Imaging Reviewed:   CXR    Cardiology:    IMPRESSION & PLAN   1. Fever, without focus for bacterial infection   Suspect COVID despite negative rapid pcr on admit  2. Asplenia  3. Mild sinus symptoms(not enough to cause a 103 fever)      Recommendations:  Continue ceftriaxone  Change azithromycin to doxy  Nasopharyngeal swab for respiratory viral PCR panel and repeat COVID routine pcr  Airborne/contact/droplet isolation  Check procalcitonin in the morning, along with ferritin, d dimer, LDH, CRP and type and screen    D/w RN and hospital medicine, patient and wife      Medical Decision Making during this encounter was  [_] Low Complexity  [_] Moderate Complexity  [ x ] High Complexity

## 2020-11-17 NOTE — PROGRESS NOTES
Consult Note  Infectious Disease    Reason for Consult:  Fever and a spleen E a    HPI: John Ball is a  45 y.o. male Known to me from prior inpatient consultation (May 2012 viral meningitis) and an outpatient visit the date of which do not have access to at this time, presented to the emergency room overnight with a maximum temperature of 102.8°.  As per my outpatient instructions for asplenic persons, whenever he had a temperature this high he was to take 750 mg of levofloxacin on the way to the hospital.  He followed my instructions. He began to feel poorly yesterday am with a headache, then malaise, and later in the day his temperature gradually escalated.  In the emergency room he had negative COVID and influenza tests, mild leukocytosis and had a slightly low oxygen saturation of 92%.  He complained of chills, denies shortness of breath and cough, and his exam was described as decreased air entry without wheezing.  He was cultured and placed on ceftriaxone and azithromycin.  He continues to have some temperature elevation, to 101 this afternoon.  Because of his outdoor exposures tick borne illness was mention by Hospital Medicine but he has had no animal contact except with a puppy. No ticks, flea bites, etc.  Blood cultures from admission are negative.  White blood cells are 13,000, platelets are normal, liver function tests are normal.  I reviewed the chest x-ray image which is unremarkable. N osick contacts, or known exposure to COVID. Not completely compliant with masking in public.     11/17:  Fever slightly better controlled on ibuprofen.  Respiratory PCR panel was all negative, routine PCR for COVID is pending, procalcitonin is normal, ferritin. Still has moderate to severe headache, without photophobia or nuchal rigidity. He is eating and moving around his room. Minimal cough, no SOB, chest pain, pleurisy, diarrhea.    COVID 19:neg rapid 11/16  Procalcitonin:  Normal  Troponin:   BNP :  D-dimer:   Negative  LDH:  104  Triglycerides:  Ferritin:  60, 165  azithromycin :  11/16 x1  Remdesivir :  Convalescent plasma:  Blood type:  A positive  IL-6:  Tocilizumab:  Recent Labs   Lab 11/16/20 0515 11/17/20  0611   CRP 1.11* 2.99*         EXAM & DIAGNOSTICS REVIEWED:   Vitals:     Temp:  [98.3 °F (36.8 °C)-101 °F (38.3 °C)]   Temp: 98.3 °F (36.8 °C) (11/17/20 1100)  Pulse: 98 (11/17/20 1100)  Resp: 19 (11/17/20 1100)  BP: 131/74 (11/17/20 1100)  SpO2: 99 % (11/17/20 1100)  No intake or output data in the 24 hours ending 11/17/20 1424    General:  In NAD. Alert and attentive, cooperative, comfortable,  flushed, diaphoretic, non toxic however.   Eyes:  Anicteric, , EOMI  ENT:  No ulcers, exudates, thrush, nares patent, dentition is excellent  Neck:  Supple   Lungs: Clear, no consolidation, rales, wheezes, rub  Heart:  RRR, no gallop/murmur/rub noted  Abd:  Soft, NT, ND, normal BS, no masses or organomegaly appreciated.  :  Voids/  no flank tenderness  Musc:  Joints without effusion, swelling, erythema, synovitis, muscle wasting. No spine tenderness  Skin:  No rashes but he is very flushed again with fever. No palmar or plantar lesions. No subungual petechiae  Wound:   Neuro:  Alert, attentive, speech fluent, face symmetric, moves all extremities, no focal weakness. Ambulatory  Psych:  Calm, cooperative  Lymphatic:        Extrem: No edema, erythema, phlebitis, cellulitis, warm and well perfused  VAD:  peripheral     Isolation:    COVID    Lines/Tubes/Drains:    General Labs reviewed:  Recent Labs   Lab 11/15/20  2133 11/16/20  0515 11/17/20  0611   WBC 12.83* 13.23* 12.44   HGB 14.9 13.7* 16.5   HCT 43.9 40.3 48.4    329 342       Recent Labs   Lab 11/15/20  2133 11/16/20  0515 11/17/20  0611    139 135*   K 3.5 3.6 4.1    108 102   CO2 26 23 24   BUN 11 9 6   CREATININE 1.1 0.9 0.9   CALCIUM 9.0 8.4* 9.1   PROT 6.9  --  7.4   BILITOT 0.8  --  0.8   ALKPHOS 52*  --  52*   ALT 31  --  32   AST 27   --  28       Micro:  Microbiology Results (last 7 days)     Procedure Component Value Units Date/Time    Respiratory Infection Panel (PCR), Nasopharyngeal [[id number]] Collected: 11/16/20 2203    Order Status: Completed Updated: 11/17/20 0954     Respiratory Infection Panel Source NP swab     Adenovirus Not Detected     Coronavirus 229E, Common Cold Virus Not Detected     Coronavirus HKU1, Common Cold Virus Not Detected     Coronavirus NL63, Common Cold Virus Not Detected     Coronavirus OC43, Common Cold Virus Not Detected     Comment: The Coronavirus strains detected in this test cause the common cold.  These strains are not the COVID-19 (novel Coronavirus)strain   associated with the respiratory disease outbreak.          Human Metapneumovirus Not Detected     Human Rhinovirus/Enterovirus Not Detected     Influenza A (subtypes H1, H1-2009,H3) Not Detected     Influenza B Not Detected     Parainfluenza Virus 1 Not Detected     Parainfluenza Virus 2 Not Detected     Parainfluenza Virus 3 Not Detected     Parainfluenza Virus 4 Not Detected     Respiratory Syncytial Virus Not Detected     Bordetella Parapertussis ([id number]) Not Detected     Bordetella pertussis (ptxP) Not Detected     Chlamydia pneumoniae Not Detected     Mycoplasma pneumoniae Not Detected     Comment: Respiratory Infection Panel testing performed by Multiplex PCR.       Narrative:      Sent to [facility]  Receiving Lab:->[place]    Blood Culture #1 **CANNOT BE ORDERED STAT** [[id number]] Collected: 11/16/20 0000    Order Status: Completed Specimen: Blood from Peripheral, Antecubital, Left Updated: 11/17/20 0232     Blood Culture, Routine No Growth to date      No Growth to date    Blood Culture #2 **CANNOT BE ORDERED STAT** [[id number]] Collected: 11/16/20 0218    Order Status: Completed Specimen: Blood from Peripheral, Antecubital, Right Updated: 11/17/20 0232     Blood Culture, Routine No Growth to date      No Growth to date    Respiratory  Viral Panel by PCR [place]; Nasopharyngeal Swab [[id number]] Collected: 11/16/20 0071    Order Status: Canceled Specimen: Respiratory         Imaging Reviewed:   CXR    Cardiology:    IMPRESSION & PLAN   1. Fever, without focus for bacterial infection   Suspect COVID despite negative rapid pcr on admit  2. Asplenia  3. Mild sinus symptoms(not enough to cause a 103 fever)      Recommendations:  Continue ceftriaxone,  doxy   repeat COVID routine pcr pending, but will begin Remdesivir  If repeat COVID is negative, I would do it a 3rd time  Airborne/contact/droplet isolation       D/w  hospital medicine, patient        Medical Decision Making during this encounter was  [_] Low Complexity  [_] Moderate Complexity  [ x ] High Complexity

## 2020-11-17 NOTE — SUBJECTIVE & OBJECTIVE
Interval History:  Patient reports on day of presentation initially developed headache, took 800 mg of ibuprofen, felt better however later in the evening myalgia/unwell, fever 101, presented to the ED. Currently reports mild shortness of breath, worse with taking deep breath, states at times does have cough productive of minimal phlegm, worse in the morning.  Does have headache with retro-orbital discomfort, reports symptoms are not near severe as when he had his viral meningitis.  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  Reports he was told [employer] member has now tested positive for COVID, he admits he was not wearing a mask during attendance.  Fever 101 yesterday at around 5:00 p.m..  Remains on room air.  Labs with WBC 12.4, hemoglobin 16.5, ferritin 165, D-dimer negative, CRP 2.99, procalcitonin 0.08, flu negative, rapid COVID negative, HIV negative, viral PCR negative, blood cultures no growth today.  Plan of care discussed with patient.      Review of Systems   Constitutional: Positive for fever. Negative for chills.   HENT: Negative for trouble swallowing.    Eyes:        Retro orbital pain   Respiratory: Positive for cough (minimal productive of phlegm) and shortness of breath.    Gastrointestinal: Negative for abdominal pain, diarrhea, nausea and vomiting.   Genitourinary: Negative for difficulty urinating and dysuria.   Musculoskeletal: Negative for neck stiffness.   Skin: Negative for wound.   Allergic/Immunologic: Positive for immunocompromised state.   Neurological: Positive for headaches.   Psychiatric/Behavioral: Negative for confusion.     Objective:     Vital Signs (Most Recent):  Temp: 99.1 °F (37.3 °C) (11/17/20 0746)  Pulse: 101 (11/17/20 0746)  Resp: 19 (11/17/20 0746)  BP: (!) 141/61 (11/17/20 0746)  SpO2: (!) 94 % (11/17/20 0746) Vital Signs (24h Range):  Temp:  [98.9 °F (37.2 °C)-101 °F (38.3 °C)] 99.1 °F (37.3 °C)  Pulse:  [] 101  Resp:  [16-19] 19  SpO2:  [94 %-97  %] 94 %  BP: (121-141)/(61-78) 141/61     Weight: 92.4 kg (203 lb 11.3 oz)  Body mass index is 31.9 kg/m².  No intake or output data in the 24 hours ending 11/17/20 1140     Physical Exam  Vitals signs and nursing note reviewed.   Constitutional:       General: He is not in acute distress.     Appearance: He is not ill-appearing, toxic-appearing or diaphoretic.      Comments: Sitting in bed, NAD, cooperative   HENT:      Head: Normocephalic and atraumatic.      Mouth/Throat:      Mouth: Mucous membranes are moist.      Comments: Coated in brown matter  Eyes:      General:         Right eye: No discharge.         Left eye: No discharge.      Extraocular Movements: Extraocular movements intact.      Conjunctiva/sclera: Conjunctivae normal.      Pupils: Pupils are equal, round, and reactive to light.   Neck:      Musculoskeletal: Neck supple. No neck rigidity.   Cardiovascular:      Rate and Rhythm: Normal rate and regular rhythm.      Comments: No LE edema  Pulmonary:      Comments: Not on supplemental O2, not using accessory muscles of respiration, speaking in full sentences, in no crackles or wheeze appreciated  Abdominal:      General: Bowel sounds are normal. There is no distension.      Palpations: Abdomen is soft.      Tenderness: There is no abdominal tenderness. There is no guarding.   Genitourinary:     Comments: No suprapubic fullness or tenderness, no Dunbar catheter  Skin:     General: Skin is warm and dry.      Capillary Refill: Capillary refill takes less than 2 seconds.      Findings: No rash.   Neurological:      General: No focal deficit present.      Mental Status: He is alert and oriented to person, place, and time.      Cranial Nerves: No cranial nerve deficit.   Psychiatric:         Mood and Affect: Mood normal.         Significant Labs:   Blood Culture:   Recent Labs   Lab 11/16/20  0000 11/16/20  0218   LABBLOO No Growth to date  No Growth to date No Growth to date  No Growth to date     BMP:    Recent Labs   Lab 11/16/20  0515         K 3.6      CO2 23   BUN 9   CREATININE 0.9   CALCIUM 8.4*     CBC:   Recent Labs   Lab 11/15/20  2133 11/16/20  0515 11/17/20  0611   WBC 12.83* 13.23* 12.44   HGB 14.9 13.7* 16.5   HCT 43.9 40.3 48.4    329 342     CMP:   Recent Labs   Lab 11/15/20  2133 11/16/20  0515    139   K 3.5 3.6    108   CO2 26 23   * 104   BUN 11 9   CREATININE 1.1 0.9   CALCIUM 9.0 8.4*   PROT 6.9  --    ALBUMIN 4.0  --    BILITOT 0.8  --    ALKPHOS 52*  --    AST 27  --    ALT 31  --    ANIONGAP 10 8   EGFRNONAA >60.0 >60.0     Cardiac Markers: No results for input(s): CKMB, MYOGLOBIN, BNP, TROPISTAT in the last 48 hours.  Lactic Acid: No results for input(s): LACTATE in the last 48 hours.  Lipid Panel: No results for input(s): CHOL, HDL, LDLCALC, TRIG, CHOLHDL in the last 48 hours.  Magnesium: No results for input(s): MG in the last 48 hours.  POCT Glucose: No results for input(s): POCTGLUCOSE in the last 48 hours.  Respiratory Culture: No results for input(s): GSRESP, RESPIRATORYC in the last 48 hours.  Troponin: No results for input(s): TROPONINI in the last 48 hours.  TSH: No results for input(s): TSH in the last 4320 hours.  Urine Culture: No results for input(s): LABURIN in the last 48 hours.  Urine Studies:   Recent Labs   Lab 11/15/20  2151   COLORU Yellow   APPEARANCEUA Clear   PHUR 7.0   SPECGRAV 1.010   PROTEINUA Negative   GLUCUA Negative   KETONESU Negative   BILIRUBINUA Negative   OCCULTUA Negative   NITRITE Negative   UROBILINOGEN Negative   LEUKOCYTESUR Negative     All pertinent labs within the past 24 hours have been reviewed.    Significant Imaging: I have reviewed all pertinent imaging results/findings within the past 24 hours.     X-ray Chest Ap Portable    Result Date: 11/15/2020  PROCEDURE:   XR CHEST AP PORTABLE  dated  11/15/2020 9:42 PM CLINICAL HISTORY:   Male 45 years of age.   pain TECHNIQUE: AP view of the chest obtained  portably at 9:42 PM PREVIOUS STUDIES:  October 16, 2020 FINDINGS: The heart is not enlarged. Mediastinal, cardiac and hilar contours are normal. Lungs are clear. There is no pleural effusion or pneumothorax. Surgical clips project over the periphery of the left upper quadrant. Bones are unremarkable. IMPRESSION: No acute findings. Stable compared with the prior study. Normal size heart. Clear lungs. Electronically Signed by Hemalatha Hinojosa on 11/16/2020 7:25 AM    Xr Chest_stennis Performed    Result Date: 10/30/2020  EXAMINATION: XR CHEST_STENNIS PERFORMED TECHNIQUE: Dual subtraction radiography is performed of the chest. COMPARISON: Chest x-ray of February 16, 2019. FINDINGS: The mediastinal and cardiac size and contours are normal.  The diaphragms of pleura are clear.  There are no intrapulmonary masses or infiltrates.  There is no pneumothorax or pleural effusion.     Negative chest x-ray. Electronically signed by: Rodriguez Porter MD Date:    10/30/2020 Time:    09:40  ECG Results          EKG 12-lead (In process)  Result time 11/16/20 05:13:58    In process by Interface, Lab In [facility] (11/16/20 05:13:58)                 Narrative:    Test Reason : R00.0,    Vent. Rate : 129 BPM     Atrial Rate : 129 BPM     P-R Int : 152 ms          QRS Dur : 080 ms      QT Int : 294 ms       P-R-T Axes : 067 265 040 degrees     QTc Int : 430 ms    Sinus tachycardia  Possible Left atrial enlargement  Right superior axis deviation  Right ventricular hypertrophy  Nonspecific T wave abnormality  Abnormal ECG  No previous ECGs available    Referred By: [email or web address]   SELF           Confirmed By:

## 2020-11-17 NOTE — PROGRESS NOTES
[facility]  Progress Note    Patient Name: John Ball  MRN: [id number]  Patient Class: IP- Inpatient   Admission Date: 11/15/2020  Length of Stay: 1 days  Attending Physician: Bev Rutledge MD  Primary Care Provider: Rodriguez Tang MD        Subjective:     Principal Problem:Fever        HPI:  45 year old male (Hx hyperlipidemia) presented to ED complaining of 102.1 fever, and generalized malaise and fatigue. No known sick contacts. Is status post traumatic splenectomy. Was warned by ID to present to a facility if ever febrile with respiratory illness. Has dry cough. No CP/SOB. In ED: labs revealed  Mild leukocytosis with normal hematocrit; normal electrolytes and renal function. COVID and influenza negative. CXR: no infiltrate. His oxygen saturation was reportedly 92% (not noted in chart) improving to 95% with supplemental oxygen. He has had tachycardia since presentation 110's (sinus). Discussed with ED provider, early pneumonia may not show infiltrate yet on CXR. Tmax in ED: 100.4    Overview/Hospital Course:  11/16/2020  Pt continues to have fevers and medication is not helping it at present. He denies headache or stiff neck. Pt does have horses and cows requiring him to go in the [place].    [facility] assumed care on 11/17/20. Chart reviewed.  History of splenectomy, horse accident with rupture.  States initially developed headache, took 800 mg of ibuprofen with improvement, later in the day feeling generally unwell with malaise and then fever 101.  Given prior history and instructions, he took Levaquin and presented to the ED. Fever 102.8.  Admitted with Rocephin and azithromycin with Infectious Disease consultation and workup.  On 11/16 still with fever, azithromycin switched doxycycline, COVID send out test ordered.  On 11/17, fever yesterday to 101, intermittent shortness of breath especially with deep breaths, states he was told [employer] member has now tested positive  for COVID, admits he was not wearing his mask in [place].    Interval History:  Patient reports on day of presentation initially developed headache, took 800 mg of ibuprofen, felt better however later in the evening myalgia/unwell, fever 101, presented to the ED. Currently reports mild shortness of breath, worse with taking deep breath, states at times does have cough productive of minimal phlegm, worse in the morning.  Does have headache with retro-orbital discomfort, reports symptoms are not near severe as when he had his viral meningitis.  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  Reports he was told [employer] member has now tested positive for COVID, he admits he was not wearing a mask during attendance.  Fever 101 yesterday at around 5:00 p.m..  Remains on room air.  Labs with WBC 12.4, hemoglobin 16.5, ferritin 165, D-dimer negative, CRP 2.99, procalcitonin 0.08, flu negative, rapid COVID negative, HIV negative, viral PCR negative, blood cultures no growth today.  Plan of care discussed with patient.      Review of Systems   Constitutional: Positive for fever. Negative for chills.   HENT: Negative for trouble swallowing.    Eyes:        Retro orbital pain   Respiratory: Positive for cough (minimal productive of phlegm) and shortness of breath.    Gastrointestinal: Negative for abdominal pain, diarrhea, nausea and vomiting.   Genitourinary: Negative for difficulty urinating and dysuria.   Musculoskeletal: Negative for neck stiffness.   Skin: Negative for wound.   Allergic/Immunologic: Positive for immunocompromised state.   Neurological: Positive for headaches.   Psychiatric/Behavioral: Negative for confusion.     Objective:     Vital Signs (Most Recent):  Temp: 99.1 °F (37.3 °C) (11/17/20 0746)  Pulse: 101 (11/17/20 0746)  Resp: 19 (11/17/20 0746)  BP: (!) 141/61 (11/17/20 0746)  SpO2: (!) 94 % (11/17/20 0746) Vital Signs (24h Range):  Temp:  [98.9 °F (37.2 °C)-101 °F (38.3 °C)] 99.1 °F (37.3  °C)  Pulse:  [] 101  Resp:  [16-19] 19  SpO2:  [94 %-97 %] 94 %  BP: (121-141)/(61-78) 141/61     Weight: 92.4 kg (203 lb 11.3 oz)  Body mass index is 31.9 kg/m².  No intake or output data in the 24 hours ending 11/17/20 1140     Physical Exam  Vitals signs and nursing note reviewed.   Constitutional:       General: He is not in acute distress.     Appearance: He is not ill-appearing, toxic-appearing or diaphoretic.      Comments: Sitting in bed, NAD, cooperative   HENT:      Head: Normocephalic and atraumatic.      Mouth/Throat:      Mouth: Mucous membranes are moist.      Comments: Coated in brown matter  Eyes:      General:         Right eye: No discharge.         Left eye: No discharge.      Extraocular Movements: Extraocular movements intact.      Conjunctiva/sclera: Conjunctivae normal.      Pupils: Pupils are equal, round, and reactive to light.   Neck:      Musculoskeletal: Neck supple. No neck rigidity.   Cardiovascular:      Rate and Rhythm: Normal rate and regular rhythm.      Comments: No LE edema  Pulmonary:      Comments: Not on supplemental O2, not using accessory muscles of respiration, speaking in full sentences, in no crackles or wheeze appreciated  Abdominal:      General: Bowel sounds are normal. There is no distension.      Palpations: Abdomen is soft.      Tenderness: There is no abdominal tenderness. There is no guarding.   Genitourinary:     Comments: No suprapubic fullness or tenderness, no Dunbar catheter  Skin:     General: Skin is warm and dry.      Capillary Refill: Capillary refill takes less than 2 seconds.      Findings: No rash.   Neurological:      General: No focal deficit present.      Mental Status: He is alert and oriented to person, place, and time.      Cranial Nerves: No cranial nerve deficit.   Psychiatric:         Mood and Affect: Mood normal.         Significant Labs:   Blood Culture:   Recent Labs   Lab 11/16/20  0000 11/16/20  0218   LABBLOO No Growth to date  No  Growth to date No Growth to date  No Growth to date     BMP:   Recent Labs   Lab 11/16/20  0515         K 3.6      CO2 23   BUN 9   CREATININE 0.9   CALCIUM 8.4*     CBC:   Recent Labs   Lab 11/15/20  2133 11/16/20  0515 11/17/20  0611   WBC 12.83* 13.23* 12.44   HGB 14.9 13.7* 16.5   HCT 43.9 40.3 48.4    329 342     CMP:   Recent Labs   Lab 11/15/20  2133 11/16/20  0515    139   K 3.5 3.6    108   CO2 26 23   * 104   BUN 11 9   CREATININE 1.1 0.9   CALCIUM 9.0 8.4*   PROT 6.9  --    ALBUMIN 4.0  --    BILITOT 0.8  --    ALKPHOS 52*  --    AST 27  --    ALT 31  --    ANIONGAP 10 8   EGFRNONAA >60.0 >60.0     Cardiac Markers: No results for input(s): CKMB, MYOGLOBIN, BNP, TROPISTAT in the last 48 hours.  Lactic Acid: No results for input(s): LACTATE in the last 48 hours.  Lipid Panel: No results for input(s): CHOL, HDL, LDLCALC, TRIG, CHOLHDL in the last 48 hours.  Magnesium: No results for input(s): MG in the last 48 hours.  POCT Glucose: No results for input(s): POCTGLUCOSE in the last 48 hours.  Respiratory Culture: No results for input(s): GSRESP, RESPIRATORYC in the last 48 hours.  Troponin: No results for input(s): TROPONINI in the last 48 hours.  TSH: No results for input(s): TSH in the last 4320 hours.  Urine Culture: No results for input(s): LABURIN in the last 48 hours.  Urine Studies:   Recent Labs   Lab 11/15/20  2151   COLORU Yellow   APPEARANCEUA Clear   PHUR 7.0   SPECGRAV 1.010   PROTEINUA Negative   GLUCUA Negative   KETONESU Negative   BILIRUBINUA Negative   OCCULTUA Negative   NITRITE Negative   UROBILINOGEN Negative   LEUKOCYTESUR Negative     All pertinent labs within the past 24 hours have been reviewed.    Significant Imaging: I have reviewed all pertinent imaging results/findings within the past 24 hours.     X-ray Chest Ap Portable    Result Date: 11/15/2020  PROCEDURE:   XR CHEST AP PORTABLE  dated  11/15/2020 9:42 PM CLINICAL HISTORY:   Male 45  years of age.   pain TECHNIQUE: AP view of the chest obtained portably at 9:42 PM PREVIOUS STUDIES:  October 16, 2020 FINDINGS: The heart is not enlarged. Mediastinal, cardiac and hilar contours are normal. Lungs are clear. There is no pleural effusion or pneumothorax. Surgical clips project over the periphery of the left upper quadrant. Bones are unremarkable. IMPRESSION: No acute findings. Stable compared with the prior study. Normal size heart. Clear lungs. Electronically Signed by Hemalatha Hinojosa on 11/16/2020 7:25 AM    Xr Chest_stennis Performed    Result Date: 10/30/2020  EXAMINATION: XR CHEST_STENNIS PERFORMED TECHNIQUE: Dual subtraction radiography is performed of the chest. COMPARISON: Chest x-ray of February 16, 2019. FINDINGS: The mediastinal and cardiac size and contours are normal.  The diaphragms of pleura are clear.  There are no intrapulmonary masses or infiltrates.  There is no pneumothorax or pleural effusion.     Negative chest x-ray. Electronically signed by: Rodriguez Porter MD Date:    10/30/2020 Time:    09:40  ECG Results          EKG 12-lead (In process)  Result time 11/16/20 05:13:58    In process by Interface, Lab In [facility] (11/16/20 05:13:58)                 Narrative:    Test Reason : R00.0,    Vent. Rate : 129 BPM     Atrial Rate : 129 BPM     P-R Int : 152 ms          QRS Dur : 080 ms      QT Int : 294 ms       P-R-T Axes : 067 265 040 degrees     QTc Int : 430 ms    Sinus tachycardia  Possible Left atrial enlargement  Right superior axis deviation  Right ventricular hypertrophy  Nonspecific T wave abnormality  Abnormal ECG  No previous ECGs available    Referred By: [email or web address]   SELF           Confirmed By:                                   Assessment/Plan:      * Fever  Fever of 102.8, no definite source currently identified.  Does have headache with retro-orbital discomfort however not as severe as his prior bout of viral meningitis, immunocompromised in the setting of  splenectomy  Intermittent shortness of breath, pleurisy, very minimal cough production.  There is a suspicion for COVID-19.  Blood cultures no growth to date.  UA negative.  Chest x-ray no consolidation.  Viral PCR negative.  Flu negative.  Currently on Rocephin and doxycycline, defer antimicrobials to Infectious Disease  Follow up pending cultures and COVID-19  Trending CBC, added on a.m. CMP  Appreciate infectious disease input      Suspected COVID-19 virus infection  There was concern fever and presentation symptomatology consistent with COVID-19.  Rapid PCR negative.  Sent out pending.  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      Hyperlipidemia  Seen previously by Dr. Noble, states he is on Lipitor 10 mg daily.        VTE Risk Mitigation (From admission, onward)         Ordered     IP VTE LOW RISK PATIENT  Once      11/16/20 0312     Place DANIEL hose  Until discontinued      11/16/20 0312                Discharge Planning   BRISSA:      Code Status: Full Code   Is the patient medically ready for discharge?:     Reason for patient still in hospital (select all that apply): Patient trending condition  Discharge Plan A: Home                  Bev Rutledge MD  Department of Hospital Medicine   [facility]

## 2020-11-17 NOTE — RESPIRATORY THERAPY
11/16/20 1950   Patient Assessment/Suction   Level of Consciousness (AVPU) alert   Respiratory Effort Unlabored   Expansion/Accessory Muscles/Retractions expansion symmetric;no retractions;no use of accessory muscles   All Lung Fields Breath Sounds clear   Rhythm/Pattern, Respiratory no shortness of breath reported;pattern regular;unlabored   PRE-TX-O2   O2 Device (Oxygen Therapy) room air   SpO2 97 %   Pulse Oximetry Type Intermittent   $ Pulse Oximetry - Multiple Charge Pulse Oximetry - Multiple   Pulse 101   Resp 16   Aerosol Therapy   $ Aerosol Therapy Charges PRN treatment not required   Respiratory Interventions   Cough And Deep Breathing done with encouragement   Breathing Techniques/Airway Clearance deep/controlled cough encouraged;diaphragmatic breathing promoted   Education   $ Education Bronchodilator;Other (see comment);15 min  (prn tx,deep diaphragmatic breathing exercises)   Respiratory Evaluation   $ Care Plan Tech Time 15 min   Evaluation For   (care plan)        11/16/20 1950   Patient Assessment/Suction   Level of Consciousness (AVPU) alert   Respiratory Effort Unlabored   Expansion/Accessory Muscles/Retractions expansion symmetric;no retractions;no use of accessory muscles   All Lung Fields Breath Sounds clear   Rhythm/Pattern, Respiratory no shortness of breath reported;pattern regular;unlabored   PRE-TX-O2   O2 Device (Oxygen Therapy) room air   SpO2 97 %   Pulse Oximetry Type Intermittent   $ Pulse Oximetry - Multiple Charge Pulse Oximetry - Multiple   Pulse 101   Resp 16   Aerosol Therapy   $ Aerosol Therapy Charges PRN treatment not required   Respiratory Interventions   Cough And Deep Breathing done with encouragement   Breathing Techniques/Airway Clearance deep/controlled cough encouraged;diaphragmatic breathing promoted   Education   $ Education Bronchodilator;Other (see comment);15 min  (prn tx,deep diaphragmatic breathing exercises)   Respiratory Evaluation   $ Care Plan Tech Time 15  min   Evaluation For   (care plan)

## 2020-11-18 PROBLEM — E87.1 HYPONATREMIA: Status: ACTIVE | Noted: 2020-11-18

## 2020-11-18 LAB
ALBUMIN SERPL BCP-MCNC: 3.8 G/DL (ref 3.5–5.2)
ALP SERPL-CCNC: 49 U/L (ref 55–135)
ALT SERPL W/O P-5'-P-CCNC: 30 U/L (ref 10–44)
ANION GAP SERPL CALC-SCNC: 8 MMOL/L (ref 8–16)
AST SERPL-CCNC: 28 U/L (ref 10–40)
BACTERIA #/AREA URNS HPF: NEGATIVE /HPF
BASOPHILS # BLD AUTO: 0.03 K/UL (ref 0–0.2)
BASOPHILS NFR BLD: 0.3 % (ref 0–1.9)
BILIRUB SERPL-MCNC: 0.6 MG/DL (ref 0.1–1)
BILIRUB UR QL STRIP: NEGATIVE
BUN SERPL-MCNC: 10 MG/DL (ref 6–20)
CALCIUM SERPL-MCNC: 8.8 MG/DL (ref 8.7–10.5)
CHLORIDE SERPL-SCNC: 102 MMOL/L (ref 95–110)
CLARITY UR: CLEAR
CO2 SERPL-SCNC: 23 MMOL/L (ref 23–29)
COLOR UR: YELLOW
CREAT SERPL-MCNC: 0.9 MG/DL (ref 0.5–1.4)
CRP SERPL-MCNC: 3.36 MG/DL
D DIMER PPP IA.FEU-MCNC: <0.27 UG/ML FEU
DIFFERENTIAL METHOD: ABNORMAL
EOSINOPHIL # BLD AUTO: 0 K/UL (ref 0–0.5)
EOSINOPHIL NFR BLD: 0 % (ref 0–8)
ERYTHROCYTE [DISTWIDTH] IN BLOOD BY AUTOMATED COUNT: 14.8 % (ref 11.5–14.5)
EST. GFR  (AFRICAN AMERICAN): >60 ML/MIN/1.73 M^2
EST. GFR  (NON AFRICAN AMERICAN): >60 ML/MIN/1.73 M^2
FERRITIN SERPL-MCNC: 119 NG/ML (ref 20–300)
GLUCOSE SERPL-MCNC: 93 MG/DL (ref 70–110)
GLUCOSE UR QL STRIP: NEGATIVE
HCT VFR BLD AUTO: 49.2 % (ref 40–54)
HGB BLD-MCNC: 16.7 G/DL (ref 14–18)
HGB UR QL STRIP: NEGATIVE
HYALINE CASTS #/AREA URNS LPF: 1 /LPF
IMM GRANULOCYTES # BLD AUTO: 0.04 K/UL (ref 0–0.04)
IMM GRANULOCYTES NFR BLD AUTO: 0.4 % (ref 0–0.5)
KETONES UR QL STRIP: NEGATIVE
LEUKOCYTE ESTERASE UR QL STRIP: NEGATIVE
LYMPHOCYTES # BLD AUTO: 3.2 K/UL (ref 1–4.8)
LYMPHOCYTES NFR BLD: 28.3 % (ref 18–48)
MCH RBC QN AUTO: 31.4 PG (ref 27–31)
MCHC RBC AUTO-ENTMCNC: 33.9 G/DL (ref 32–36)
MCV RBC AUTO: 93 FL (ref 82–98)
MICROSCOPIC COMMENT: NORMAL
MONOCYTES # BLD AUTO: 1.4 K/UL (ref 0.3–1)
MONOCYTES NFR BLD: 12.3 % (ref 4–15)
NEUTROPHILS # BLD AUTO: 6.7 K/UL (ref 1.8–7.7)
NEUTROPHILS NFR BLD: 58.7 % (ref 38–73)
NITRITE UR QL STRIP: NEGATIVE
NRBC BLD-RTO: 0 /100 WBC
PH UR STRIP: 7 [PH] (ref 5–8)
PLATELET # BLD AUTO: 327 K/UL (ref 150–350)
PMV BLD AUTO: 10.6 FL (ref 9.2–12.9)
POTASSIUM SERPL-SCNC: 3.8 MMOL/L (ref 3.5–5.1)
PROT SERPL-MCNC: 7 G/DL (ref 6–8.4)
PROT UR QL STRIP: ABNORMAL
RBC # BLD AUTO: 5.32 M/UL (ref 4.6–6.2)
RBC #/AREA URNS HPF: 3 /HPF (ref 0–4)
SODIUM SERPL-SCNC: 133 MMOL/L (ref 136–145)
SP GR UR STRIP: 1.03 (ref 1–1.03)
SQUAMOUS #/AREA URNS HPF: 1 /HPF
URN SPEC COLLECT METH UR: ABNORMAL
UROBILINOGEN UR STRIP-ACNC: ABNORMAL EU/DL
WBC # BLD AUTO: 11.36 K/UL (ref 3.9–12.7)
WBC #/AREA URNS HPF: 2 /HPF (ref 0–5)

## 2020-11-18 PROCEDURE — 25000003 PHARM REV CODE 250: Performed by: INTERNAL MEDICINE

## 2020-11-18 PROCEDURE — 36415 COLL VENOUS BLD VENIPUNCTURE: CPT

## 2020-11-18 PROCEDURE — 94761 N-INVAS EAR/PLS OXIMETRY MLT: CPT

## 2020-11-18 PROCEDURE — 99900031 HC PATIENT EDUCATION (STAT)

## 2020-11-18 PROCEDURE — 81001 URINALYSIS AUTO W/SCOPE: CPT

## 2020-11-18 PROCEDURE — 99232 PR SUBSEQUENT HOSPITAL CARE,LEVL II: ICD-10-PCS | Mod: ,,, | Performed by: INTERNAL MEDICINE

## 2020-11-18 PROCEDURE — 99232 SBSQ HOSP IP/OBS MODERATE 35: CPT | Mod: ,,, | Performed by: INTERNAL MEDICINE

## 2020-11-18 PROCEDURE — 87040 BLOOD CULTURE FOR BACTERIA: CPT

## 2020-11-18 PROCEDURE — 12000002 HC ACUTE/MED SURGE SEMI-PRIVATE ROOM

## 2020-11-18 PROCEDURE — 86140 C-REACTIVE PROTEIN: CPT

## 2020-11-18 PROCEDURE — 63600175 PHARM REV CODE 636 W HCPCS: Performed by: INTERNAL MEDICINE

## 2020-11-18 PROCEDURE — 85379 FIBRIN DEGRADATION QUANT: CPT

## 2020-11-18 PROCEDURE — 82728 ASSAY OF FERRITIN: CPT

## 2020-11-18 PROCEDURE — 99900035 HC TECH TIME PER 15 MIN (STAT)

## 2020-11-18 PROCEDURE — 94760 N-INVAS EAR/PLS OXIMETRY 1: CPT

## 2020-11-18 PROCEDURE — 80053 COMPREHEN METABOLIC PANEL: CPT

## 2020-11-18 PROCEDURE — 85025 COMPLETE CBC W/AUTO DIFF WBC: CPT

## 2020-11-18 PROCEDURE — 87086 URINE CULTURE/COLONY COUNT: CPT

## 2020-11-18 RX ADMIN — ONDANSETRON 4 MG: 2 INJECTION INTRAMUSCULAR; INTRAVENOUS at 07:11

## 2020-11-18 RX ADMIN — DOXYCYCLINE HYCLATE 100 MG: 100 CAPSULE ORAL at 08:11

## 2020-11-18 RX ADMIN — OXYCODONE HYDROCHLORIDE AND ACETAMINOPHEN 1 TABLET: 10; 325 TABLET ORAL at 05:11

## 2020-11-18 RX ADMIN — OXYCODONE HYDROCHLORIDE AND ACETAMINOPHEN 1 TABLET: 10; 325 TABLET ORAL at 09:11

## 2020-11-18 RX ADMIN — ACETAMINOPHEN 1000 MG: 500 TABLET, FILM COATED ORAL at 07:11

## 2020-11-18 RX ADMIN — ATORVASTATIN CALCIUM 10 MG: 10 TABLET, FILM COATED ORAL at 09:11

## 2020-11-18 RX ADMIN — CEFTRIAXONE 1 G: 1 INJECTION, SOLUTION INTRAVENOUS at 02:11

## 2020-11-18 RX ADMIN — IBUPROFEN 400 MG: 400 TABLET, FILM COATED ORAL at 10:11

## 2020-11-18 RX ADMIN — DOXYCYCLINE HYCLATE 100 MG: 100 CAPSULE ORAL at 09:11

## 2020-11-18 RX ADMIN — REMDESIVIR 100 MG: 100 INJECTION, POWDER, LYOPHILIZED, FOR SOLUTION INTRAVENOUS at 08:11

## 2020-11-18 NOTE — PROGRESS NOTES
Consult Note  Infectious Disease    Reason for Consult:  Fever and a spleen E a    HPI: John Ball is a  45 y.o. male Known to me from prior inpatient consultation (May 2012 viral meningitis) and an outpatient visit the date of which do not have access to at this time, presented to the emergency room overnight with a maximum temperature of 102.8°.  As per my outpatient instructions for asplenic persons, whenever he had a temperature this high he was to take 750 mg of levofloxacin on the way to the hospital.  He followed my instructions. He began to feel poorly yesterday am with a headache, then malaise, and later in the day his temperature gradually escalated.  In the emergency room he had negative COVID and influenza tests, mild leukocytosis and had a slightly low oxygen saturation of 92%.  He complained of chills, denies shortness of breath and cough, and his exam was described as decreased air entry without wheezing.  He was cultured and placed on ceftriaxone and azithromycin.  He continues to have some temperature elevation, to 101 this afternoon.  Because of his outdoor exposures tick borne illness was mention by Hospital Medicine but he has had no animal contact except with a puppy. No ticks, flea bites, etc.  Blood cultures from admission are negative.  White blood cells are 13,000, platelets are normal, liver function tests are normal.  I reviewed the chest x-ray image which is unremarkable. N osick contacts, or known exposure to COVID. Not completely compliant with masking in public.     11/17:  Fever slightly better controlled on ibuprofen.  Respiratory PCR panel was all negative, routine PCR for COVID is pending, procalcitonin is normal, ferritin. Still has moderate to severe headache, without photophobia or nuchal rigidity. He is eating and moving around his room. Minimal cough, no SOB, chest pain, pleurisy, diarrhea.  11/18: looks and feels better today. Moving around his room without difficulty.  Rare cough, no sOB. Headache this am but not this afternoon. Eating well. No antibiotic or antiviral side effect.     COVID 19:neg rapid 11/16  Procalcitonin:  Normal  Troponin:   BNP :  D-dimer:  Negative  LDH:  104  Triglycerides:  Ferritin:  60, 165  azithromycin :  11/16 x1  Remdesivir :11/17  Convalescent plasma:  Blood type:  A positive  IL-6:  Tocilizumab:  Recent Labs   Lab 11/16/20 0515 11/17/20  0611 11/18/20  0647   CRP 1.11* 2.99* 3.36*         EXAM & DIAGNOSTICS REVIEWED:   Vitals:     Temp:  [98.3 °F (36.8 °C)-102.8 °F (39.3 °C)]   Temp: 98.9 °F (37.2 °C) (11/18/20 0516)  Pulse: 105 (11/18/20 0516)  Resp: 18 (11/18/20 0516)  BP: 124/74 (11/18/20 0516)  SpO2: (!) 94 % (11/18/20 0516)    Intake/Output Summary (Last 24 hours) at 11/18/2020 0802  Last data filed at 11/18/2020 0600  Gross per 24 hour   Intake 550 ml   Output --   Net 550 ml       General:  In NAD. Alert and attentive, cooperative, comfortable   Eyes:  Anicteric, , EOMI  ENT:  No ulcers, exudates, thrush, nares patent, dentition is excellent  Neck:  Supple   Lungs: Clear, no consolidation, rales, wheezes, rub  Heart:  RRR, no gallop/murmur/rub noted  Abd:  Soft, NT, ND, normal BS, no masses or organomegaly appreciated.  :  Voids/  no flank tenderness  Musc:  Joints without effusion, swelling, erythema, synovitis, muscle wasting. No spine tenderness  Skin:  No rashes but he is very flushed again with fever. No palmar or plantar lesions. No subungual petechiae  Wound:   Neuro:  Alert, attentive, speech fluent, face symmetric, moves all extremities, no focal weakness. Ambulatory  Psych:  Calm, cooperative  Lymphatic:        Extrem: No edema, erythema, phlebitis, cellulitis, warm and well perfused, calves soft  VAD:  peripheral     Isolation:    COVID    Lines/Tubes/Drains:    General Labs reviewed:  Recent Labs   Lab 11/16/20  0515 11/17/20  0611 11/18/20  0647   WBC 13.23* 12.44 11.36   HGB 13.7* 16.5 16.7   HCT 40.3 48.4 49.2    342  327       Recent Labs   Lab 11/15/20  2133 11/16/20  0515 11/17/20  0611 11/18/20  0647    139 135* 133*   K 3.5 3.6 4.1 3.8    108 102 102   CO2 26 23 24 23   BUN 11 9 6 10   CREATININE 1.1 0.9 0.9 0.9   CALCIUM 9.0 8.4* 9.1 8.8   PROT 6.9  --  7.4 7.0   BILITOT 0.8  --  0.8 0.6   ALKPHOS 52*  --  52* 49*   ALT 31  --  32 30   AST 27  --  28 28       Micro:  Microbiology Results (last 7 days)     Procedure Component Value Units Date/Time    Blood Culture #1 **CANNOT BE ORDERED STAT** [[id number]] Collected: 11/16/20 0000    Order Status: Completed Specimen: Blood from Peripheral, Antecubital, Left Updated: 11/18/20 0232     Blood Culture, Routine No Growth to date      No Growth to date      No Growth to date    Blood Culture #2 **CANNOT BE ORDERED STAT** [[id number]] Collected: 11/16/20 0218    Order Status: Completed Specimen: Blood from Peripheral, Antecubital, Right Updated: 11/18/20 0232     Blood Culture, Routine No Growth to date      No Growth to date      No Growth to date    Respiratory Infection Panel (PCR), Nasopharyngeal [[id number]] Collected: 11/16/20 2203    Order Status: Completed Updated: 11/17/20 0954     Respiratory Infection Panel Source NP swab     Adenovirus Not Detected     Coronavirus 229E, Common Cold Virus Not Detected     Coronavirus HKU1, Common Cold Virus Not Detected     Coronavirus NL63, Common Cold Virus Not Detected     Coronavirus OC43, Common Cold Virus Not Detected     Comment: The Coronavirus strains detected in this test cause the common cold.  These strains are not the COVID-19 (novel Coronavirus)strain   associated with the respiratory disease outbreak.          Human Metapneumovirus Not Detected     Human Rhinovirus/Enterovirus Not Detected     Influenza A (subtypes H1, H1-2009,H3) Not Detected     Influenza B Not Detected     Parainfluenza Virus 1 Not Detected     Parainfluenza Virus 2 Not Detected     Parainfluenza Virus 3 Not Detected     Parainfluenza Virus 4  Not Detected     Respiratory Syncytial Virus Not Detected     Bordetella Parapertussis (QM1174) Not Detected     Bordetella pertussis (ptxP) Not Detected     Chlamydia pneumoniae Not Detected     Mycoplasma pneumoniae Not Detected     Comment: Respiratory Infection Panel testing performed by Multiplex PCR.       Narrative:      Sent to [facility]  Receiving Lab:->[facility]    Respiratory Viral Panel by PCR [facility]; Nasopharyngeal Swab [[id number]] Collected: 11/16/20 2208    Order Status: Canceled Specimen: Respiratory         Imaging Reviewed:   CXR    Cardiology:    IMPRESSION & PLAN   1. Fever, without focus for bacterial infection   Suspect COVID despite negative rapid pcr on admit  2. Asplenia  3. Mild sinus symptoms(not enough to cause a 103 fever)      Recommendations:  Continue ceftriaxone,  doxy   repeat COVID routine pcr pending, but will continue Remdesivir  If repeat COVID is negative, I would do it a 3rd time  Airborne/contact/droplet isolation       Medical Decision Making during this encounter was  [_] Low Complexity  [_] Moderate Complexity  [ x ] High Complexity

## 2020-11-18 NOTE — NURSING
Pt medicated for fever.  Pt has elevated pulse.  See graphics. Pt states he feels ok.  Med given.  See MAR.  Dr. Weeks aware.

## 2020-11-18 NOTE — NURSING
Fever trending down.  Pt in room without blanket on to cool off.  Face and body appears reddish in color.  Pt states he gets like that after he has fever.

## 2020-11-18 NOTE — PROGRESS NOTES
[facility]  Progress Note    Patient Name: John Ball  MRN: [id number]  Patient Class: IP- Inpatient   Admission Date: 11/15/2020  Length of Stay: 2 days  Attending Physician: Bev Rutledge MD  Primary Care Provider: Rodriguez Tang MD        Subjective:     Principal Problem:Fever        HPI:  45 year old male (Hx hyperlipidemia) presented to ED complaining of 102.1 fever, and generalized malaise and fatigue. No known sick contacts. Is status post traumatic splenectomy. Was warned by ID to present to a facility if ever febrile with respiratory illness. Has dry cough. No CP/SOB. In ED: labs revealed  Mild leukocytosis with normal hematocrit; normal electrolytes and renal function. COVID and influenza negative. CXR: no infiltrate. His oxygen saturation was reportedly 92% (not noted in chart) improving to 95% with supplemental oxygen. He has had tachycardia since presentation 110's (sinus). Discussed with ED provider, early pneumonia may not show infiltrate yet on CXR. Tmax in ED: 100.4    Overview/Hospital Course:  11/16/2020  Pt continues to have fevers and medication is not helping it at present. He denies headache or stiff neck. Pt does have horses and cows requiring him to go in the j[place].    [facility] assumed care on 11/17/20. Chart reviewed.  History of splenectomy, horse accident with rupture.  States initially developed headache, took 800 mg of ibuprofen with improvement, later in the day feeling generally unwell with malaise and then fever 101.  Given prior history and instructions, he took Levaquin and presented to the ED. Fever 102.8.  Admitted with Rocephin and azithromycin with Infectious Disease consultation and workup.  On 11/16 still with fever, azithromycin switched doxycycline, COVID send out test ordered.  On 11/17, fever yesterday to 101, intermittent shortness of breath especially with deep breaths, states he was told [employer] member has now tested positive  for COVID, admits he was not wearing his mask in [employer].  Following discussions with Infectious Disease given suspicion for COVID-19, he was empirically started on RDV.  On 11/18 patient with continued fever of 102.8 overnight, associated with shivering, during the daytime temperature is better, appears comfortable, no new symptoms however states about 1 week ago did have tingling sensation in his left air, mild bloody nasal discharge which he feels is due to COVID testing, repeating infectious workup, if unrevealing will have to consider autoimmune.    Interval History:  Patient seen ambulating in room, states he feels better today however did not feel well overnight, shivering with fever up to 102.8 associated with flushing.  States earlier today did have episode of posterior headache felt like muscle tensing.  Mild episode of bloody nasal discharge, feels may due to COVID-19 swabbing.  States about 1 week ago did have tingling sensation deep in his left air, self resolved, denies any hearing changes, discharge, ear pain.  Unfortunately have not been able to locate othoscope at this time.  T-max 102.8°.  Not on supplemental oxygen.  Labs with sodium 133, CRP 3.36, blood cultures no growth to date, COVID send out still in process.  Following discussions with Infectious Disease yesterday he was empirically started on remdesivir due to the suspected COVID-19.  Discussed with Nursing.  Patient updated plan of care.     Review of Systems   Constitutional: Positive for chills and fever.   HENT: Positive for nosebleeds (Mild, soft resolved).    Respiratory: Negative for cough and shortness of breath.    Cardiovascular: Negative for chest pain.   Gastrointestinal: Negative for abdominal pain, diarrhea, nausea and vomiting.   Allergic/Immunologic: Positive for immunocompromised state.   Neurological: Positive for headaches (Mostly posterior).     Objective:     Vital Signs (Most Recent):  Temp: 98.4 °F (36.9 °C) (11/18/20  1333)  Pulse: 103 (11/18/20 1333)  Resp: 18 (11/18/20 1333)  BP: 139/70 (11/18/20 1333)  SpO2: 98 % (11/18/20 1333) Vital Signs (24h Range):  Temp:  [98.4 °F (36.9 °C)-102.8 °F (39.3 °C)] 98.4 °F (36.9 °C)  Pulse:  [] 103  Resp:  [18] 18  SpO2:  [94 %-98 %] 98 %  BP: (124-156)/(64-81) 139/70     Weight: 92.4 kg (203 lb 11.3 oz)  Body mass index is 31.9 kg/m².    Intake/Output Summary (Last 24 hours) at 11/18/2020 1516  Last data filed at 11/18/2020 0600  Gross per 24 hour   Intake 550 ml   Output --   Net 550 ml      Physical Exam  Vitals signs and nursing note reviewed.   Constitutional:       General: He is not in acute distress.     Appearance: He is not ill-appearing, toxic-appearing or diaphoretic.      Comments: Ambulating in room, no apparent distress, cooperative   HENT:      Head: Normocephalic and atraumatic.      Mouth/Throat:      Mouth: Mucous membranes are moist.   Eyes:      General:         Right eye: No discharge.         Left eye: No discharge.      Extraocular Movements: Extraocular movements intact.      Conjunctiva/sclera: Conjunctivae normal.      Pupils: Pupils are equal, round, and reactive to light.   Neck:      Musculoskeletal: Neck supple. No neck rigidity.   Cardiovascular:      Rate and Rhythm: Normal rate and regular rhythm.      Comments: No LE edema  Pulmonary:      Comments: Not on supplemental O2, not using accessory muscles of respiration, speaking in full sentences, no crackles or wheeze appreciated  Abdominal:      General: Bowel sounds are normal. There is no distension.      Palpations: Abdomen is soft.      Tenderness: There is no abdominal tenderness. There is no guarding.   Genitourinary:     Comments: No suprapubic fullness or tenderness  Skin:     General: Skin is warm and dry.      Capillary Refill: Capillary refill takes less than 2 seconds.      Findings: No rash.   Neurological:      General: No focal deficit present.      Mental Status: He is alert and oriented  to person, place, and time.      Cranial Nerves: No cranial nerve deficit.   Psychiatric:         Mood and Affect: Mood normal.         Significant Labs:   Blood Culture: No results for input(s): LABBLOO in the last 48 hours.  BMP:   Recent Labs   Lab 11/18/20  0647   GLU 93   *   K 3.8      CO2 23   BUN 10   CREATININE 0.9   CALCIUM 8.8     CBC:   Recent Labs   Lab 11/17/20  0611 11/18/20  0647   WBC 12.44 11.36   HGB 16.5 16.7   HCT 48.4 49.2    327     CMP:   Recent Labs   Lab 11/17/20  0611 11/18/20  0647   * 133*   K 4.1 3.8    102   CO2 24 23    93   BUN 6 10   CREATININE 0.9 0.9   CALCIUM 9.1 8.8   PROT 7.4 7.0   ALBUMIN 4.2 3.8   BILITOT 0.8 0.6   ALKPHOS 52* 49*   AST 28 28   ALT 32 30   ANIONGAP 9 8   EGFRNONAA >60.0 >60.0     Cardiac Markers: No results for input(s): CKMB, MYOGLOBIN, BNP, TROPISTAT in the last 48 hours.  Lactic Acid: No results for input(s): LACTATE in the last 48 hours.  Magnesium: No results for input(s): MG in the last 48 hours.  POCT Glucose: No results for input(s): POCTGLUCOSE in the last 48 hours.  Respiratory Culture: No results for input(s): GSRESP, RESPIRATORYC in the last 48 hours.  TSH: No results for input(s): TSH in the last 4320 hours.  Urine Culture: No results for input(s): LABURIN in the last 48 hours.  Urine Studies: No results for input(s): COLORU, APPEARANCEUA, PHUR, SPECGRAV, PROTEINUA, GLUCUA, KETONESU, BILIRUBINUA, OCCULTUA, NITRITE, UROBILINOGEN, LEUKOCYTESUR, RBCUA, WBCUA, BACTERIA, SQUAMEPITHEL, HYALINECASTS in the last 48 hours.    Invalid input(s): WRIGHTSUR  All pertinent labs within the past 24 hours have been reviewed.    Significant Imaging: I have reviewed all pertinent imaging results/findings within the past 24 hours.      Assessment/Plan:      * Fever  Fever of 102.8, no definite source currently identified.  Does have headache with retro-orbital discomfort however not as severe as his prior bout of viral  meningitis, immunocompromised in the setting of splenectomy  Intermittent shortness of breath, pleurisy, very minimal cough production.  There is a suspicion for COVID-19.  Blood cultures no growth to date.  UA negative.  Chest x-ray no consolidation.  Viral PCR negative.  Flu negative.  Currently on Rocephin and doxycycline, defer antimicrobials to Infectious Disease  Follow up pending cultures and COVID-19 send out  Given repeat high-grade fever, seems to be nocturnal, re-culturing, chest x-ray, blood in urine culture, if recurrent may have to broaden differential to include autoimmune  Othoscope to examine the ear, may consider sinus imaging  Trending labs  Appreciate infectious disease input      Suspected COVID-19 virus infection  There was concern fever and presentation symptomatology consistent with COVID-19.  Rapid PCR negative.  Sent out pending.  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      Hyponatremia  Monitoring      Hyperlipidemia  Seen previously by Dr. Noble, states he is on Lipitor 10 mg daily.        VTE Risk Mitigation (From admission, onward)         Ordered     IP VTE LOW RISK PATIENT  Once      11/16/20 0312     Place DANIEL hose  Until discontinued      11/16/20 0312                Discharge Planning   BRISSA:      Code Status: Full Code   Is the patient medically ready for discharge?:     Reason for patient still in hospital (select all that apply): Patient trending condition  Discharge Plan A: Home                  Bev Rutledge MD  Department of Hospital Medicine   [facility]

## 2020-11-18 NOTE — ASSESSMENT & PLAN NOTE
There was concern fever and presentation symptomatology consistent with COVID-19.  Rapid PCR negative.  Sent out pending.  On 11/18 was started on remdesivir  Continue isolation precaution

## 2020-11-18 NOTE — RESPIRATORY THERAPY
11/17/20 0013   Patient Assessment/Suction   Level of Consciousness (AVPU) alert   Respiratory Effort Unlabored   Expansion/Accessory Muscles/Retractions expansion symmetric;no retractions;no use of accessory muscles   All Lung Fields Breath Sounds clear   Rhythm/Pattern, Respiratory no shortness of breath reported;pattern regular;unlabored   Cough Type dry;good;nonproductive   PRE-TX-O2   O2 Device (Oxygen Therapy) room air   SpO2 98 %   Pulse Oximetry Type Intermittent   $ Pulse Oximetry - Multiple Charge Pulse Oximetry - Multiple   Pulse 110   Resp 20   Aerosol Therapy   $ Aerosol Therapy Charges PRN treatment not required   Respiratory Interventions   Cough And Deep Breathing done with encouragement   Breathing Techniques/Airway Clearance deep/controlled cough encouraged;diaphragmatic breathing promoted   Education   $ Education Bronchodilator;Other (see comment);15 min  (prn tx,deep diaphragmatic breathing exercises)   Respiratory Evaluation   $ Care Plan Tech Time 15 min   Evaluation For   (care plan)        11/17/20 0013   Patient Assessment/Suction   Level of Consciousness (AVPU) alert   Respiratory Effort Unlabored   Expansion/Accessory Muscles/Retractions expansion symmetric;no retractions;no use of accessory muscles   All Lung Fields Breath Sounds clear   Rhythm/Pattern, Respiratory no shortness of breath reported;pattern regular;unlabored   Cough Type dry;good;nonproductive   PRE-TX-O2   O2 Device (Oxygen Therapy) room air   SpO2 98 %   Pulse Oximetry Type Intermittent   $ Pulse Oximetry - Multiple Charge Pulse Oximetry - Multiple   Pulse 110   Resp 20   Aerosol Therapy   $ Aerosol Therapy Charges PRN treatment not required   Respiratory Interventions   Cough And Deep Breathing done with encouragement   Breathing Techniques/Airway Clearance deep/controlled cough encouraged;diaphragmatic breathing promoted   Education   $ Education Bronchodilator;Other (see comment);15 min  (prn tx,deep diaphragmatic  breathing exercises)   Respiratory Evaluation   $ Care Plan Tech Time 15 min   Evaluation For   (care plan)

## 2020-11-18 NOTE — PLAN OF CARE
Problem: Adult Inpatient Plan of Care  Goal: Plan of Care Review  Outcome: Ongoing, Progressing  Goal: Absence of Hospital-Acquired Illness or Injury  Outcome: Ongoing, Progressing  Goal: Optimal Comfort and Wellbeing  Outcome: Ongoing, Progressing     Problem: Fluid Imbalance (Pneumonia)  Goal: Fluid Balance  Outcome: Ongoing, Progressing     Problem: Respiratory Compromise (Pneumonia)  Goal: Effective Oxygenation and Ventilation  Outcome: Ongoing, Progressing

## 2020-11-18 NOTE — ASSESSMENT & PLAN NOTE
Fever of 102.8, no definite source currently identified.  Does have headache with retro-orbital discomfort however not as severe as his prior bout of viral meningitis, immunocompromised in the setting of splenectomy  Intermittent shortness of breath, pleurisy, very minimal cough production.  There is a suspicion for COVID-19.  Blood cultures no growth to date.  UA negative.  Chest x-ray no consolidation.  Viral PCR negative.  Flu negative.  Currently on Rocephin and doxycycline, defer antimicrobials to Infectious Disease  Follow up pending cultures and COVID-19 send out  Given repeat high-grade fever, seems to be nocturnal, re-culturing, chest x-ray, blood in urine culture, if recurrent may have to broaden differential to include autoimmune  Othoscope to examine the ear, may consider sinus imaging  Trending labs  Appreciate infectious disease input

## 2020-11-18 NOTE — PLAN OF CARE
11/18/20 0859   Patient Assessment/Suction   Level of Consciousness (AVPU) alert   Respiratory Effort Normal;Unlabored   All Lung Fields Breath Sounds clear   PRE-TX-O2   O2 Device (Oxygen Therapy) room air   SpO2 95 %   Pulse Oximetry Type Intermittent   $ Pulse Oximetry - Multiple Charge Pulse Oximetry - Multiple   Pulse 98   Resp 18   Aerosol Therapy   $ Aerosol Therapy Charges PRN treatment not required   Respiratory Treatment Status (SVN) PRN treatment not required   Respiratory Evaluation   $ Care Plan Tech Time 15 min

## 2020-11-18 NOTE — PLAN OF CARE
Problem: Adult Inpatient Plan of Care  Goal: Plan of Care Review  11/18/2020 0548 by Sheila Goodman RN  Outcome: Ongoing, Progressing  11/18/2020 0526 by Sheila Goodman RN  Outcome: Ongoing, Progressing  Goal: Patient-Specific Goal (Individualization)  Outcome: Ongoing, Progressing  Goal: Absence of Hospital-Acquired Illness or Injury  11/18/2020 0548 by Sheila Goodman RN  Outcome: Ongoing, Progressing  11/18/2020 0526 by Sheila Goodman RN  Outcome: Ongoing, Progressing  Goal: Optimal Comfort and Wellbeing  11/18/2020 0548 by Sheila Goodman RN  Outcome: Ongoing, Progressing  11/18/2020 0526 by Sheila Goodman RN  Outcome: Ongoing, Progressing  Goal: Readiness for Transition of Care  Outcome: Ongoing, Progressing     Problem: Fluid Imbalance (Pneumonia)  Goal: Fluid Balance  Outcome: Ongoing, Progressing     Problem: Infection (Pneumonia)  Goal: Resolution of Infection Signs/Symptoms  Outcome: Ongoing, Progressing     Problem: Respiratory Compromise (Pneumonia)  Goal: Effective Oxygenation and Ventilation  11/18/2020 0548 by Sheila Goodman RN  Outcome: Ongoing, Progressing  11/18/2020 0526 by Sheila Goodman RN  Outcome: Ongoing, Progressing

## 2020-11-18 NOTE — NURSING
Temperature increasing past medication.  See graphics.  Pt states he is no longer shivering.  Will monitor.  Doctor aware.

## 2020-11-18 NOTE — SUBJECTIVE & OBJECTIVE
Interval History:  Patient seen ambulating in room, states he feels better today however did not feel well overnight, shivering with fever up to 102.8 associated with flushing.  States earlier today did have episode of posterior headache felt like muscle tensing.  Mild episode of bloody nasal discharge, feels may due to COVID-19 swabbing.  States about 1 week ago did have tingling sensation deep in his left air, self resolved, denies any hearing changes, discharge, ear pain.  Unfortunately have not been able to locate othoscope at this time.  T-max 102.8°.  Not on supplemental oxygen.  Labs with sodium 133, CRP 3.36, blood cultures no growth to date, COVID send out still in process.  Following discussions with Infectious Disease yesterday he was empirically started on remdesivir due to the suspected COVID-19.  Discussed with Nursing.  Patient updated plan of care.     Review of Systems   Constitutional: Positive for chills and fever.   HENT: Positive for nosebleeds (Mild, soft resolved).    Respiratory: Negative for cough and shortness of breath.    Cardiovascular: Negative for chest pain.   Gastrointestinal: Negative for abdominal pain, diarrhea, nausea and vomiting.   Allergic/Immunologic: Positive for immunocompromised state.   Neurological: Positive for headaches (Mostly posterior).     Objective:     Vital Signs (Most Recent):  Temp: 98.4 °F (36.9 °C) (11/18/20 1333)  Pulse: 103 (11/18/20 1333)  Resp: 18 (11/18/20 1333)  BP: 139/70 (11/18/20 1333)  SpO2: 98 % (11/18/20 1333) Vital Signs (24h Range):  Temp:  [98.4 °F (36.9 °C)-102.8 °F (39.3 °C)] 98.4 °F (36.9 °C)  Pulse:  [] 103  Resp:  [18] 18  SpO2:  [94 %-98 %] 98 %  BP: (124-156)/(64-81) 139/70     Weight: 92.4 kg (203 lb 11.3 oz)  Body mass index is 31.9 kg/m².    Intake/Output Summary (Last 24 hours) at 11/18/2020 1516  Last data filed at 11/18/2020 0600  Gross per 24 hour   Intake 550 ml   Output --   Net 550 ml      Physical Exam  Vitals signs and  nursing note reviewed.   Constitutional:       General: He is not in acute distress.     Appearance: He is not ill-appearing, toxic-appearing or diaphoretic.      Comments: Ambulating in room, no apparent distress, cooperative   HENT:      Head: Normocephalic and atraumatic.      Mouth/Throat:      Mouth: Mucous membranes are moist.   Eyes:      General:         Right eye: No discharge.         Left eye: No discharge.      Extraocular Movements: Extraocular movements intact.      Conjunctiva/sclera: Conjunctivae normal.      Pupils: Pupils are equal, round, and reactive to light.   Neck:      Musculoskeletal: Neck supple. No neck rigidity.   Cardiovascular:      Rate and Rhythm: Normal rate and regular rhythm.      Comments: No LE edema  Pulmonary:      Comments: Not on supplemental O2, not using accessory muscles of respiration, speaking in full sentences, no crackles or wheeze appreciated  Abdominal:      General: Bowel sounds are normal. There is no distension.      Palpations: Abdomen is soft.      Tenderness: There is no abdominal tenderness. There is no guarding.   Genitourinary:     Comments: No suprapubic fullness or tenderness  Skin:     General: Skin is warm and dry.      Capillary Refill: Capillary refill takes less than 2 seconds.      Findings: No rash.   Neurological:      General: No focal deficit present.      Mental Status: He is alert and oriented to person, place, and time.      Cranial Nerves: No cranial nerve deficit.   Psychiatric:         Mood and Affect: Mood normal.         Significant Labs:   Blood Culture: No results for input(s): LABBLOO in the last 48 hours.  BMP:   Recent Labs   Lab 11/18/20  0647   GLU 93   *   K 3.8      CO2 23   BUN 10   CREATININE 0.9   CALCIUM 8.8     CBC:   Recent Labs   Lab 11/17/20  0611 11/18/20  0647   WBC 12.44 11.36   HGB 16.5 16.7   HCT 48.4 49.2    327     CMP:   Recent Labs   Lab 11/17/20  0611 11/18/20  0647   * 133*   K 4.1 3.8     102   CO2 24 23    93   BUN 6 10   CREATININE 0.9 0.9   CALCIUM 9.1 8.8   PROT 7.4 7.0   ALBUMIN 4.2 3.8   BILITOT 0.8 0.6   ALKPHOS 52* 49*   AST 28 28   ALT 32 30   ANIONGAP 9 8   EGFRNONAA >60.0 >60.0     Cardiac Markers: No results for input(s): CKMB, MYOGLOBIN, BNP, TROPISTAT in the last 48 hours.  Lactic Acid: No results for input(s): LACTATE in the last 48 hours.  Magnesium: No results for input(s): MG in the last 48 hours.  POCT Glucose: No results for input(s): POCTGLUCOSE in the last 48 hours.  Respiratory Culture: No results for input(s): GSRESP, RESPIRATORYC in the last 48 hours.  TSH: No results for input(s): TSH in the last 4320 hours.  Urine Culture: No results for input(s): LABURIN in the last 48 hours.  Urine Studies: No results for input(s): COLORU, APPEARANCEUA, PHUR, SPECGRAV, PROTEINUA, GLUCUA, KETONESU, BILIRUBINUA, OCCULTUA, NITRITE, UROBILINOGEN, LEUKOCYTESUR, RBCUA, WBCUA, BACTERIA, SQUAMEPITHEL, HYALINECASTS in the last 48 hours.    Invalid input(s): KASSIE  All pertinent labs within the past 24 hours have been reviewed.    Significant Imaging: I have reviewed all pertinent imaging results/findings within the past 24 hours.

## 2020-11-19 VITALS
SYSTOLIC BLOOD PRESSURE: 139 MMHG | TEMPERATURE: 98 F | BODY MASS INDEX: 31.97 KG/M2 | OXYGEN SATURATION: 95 % | HEART RATE: 93 BPM | RESPIRATION RATE: 16 BRPM | DIASTOLIC BLOOD PRESSURE: 69 MMHG | HEIGHT: 67 IN | WEIGHT: 203.69 LBS

## 2020-11-19 LAB
ALBUMIN SERPL BCP-MCNC: 3.7 G/DL (ref 3.5–5.2)
ALP SERPL-CCNC: 45 U/L (ref 55–135)
ALT SERPL W/O P-5'-P-CCNC: 25 U/L (ref 10–44)
ANION GAP SERPL CALC-SCNC: 9 MMOL/L (ref 8–16)
AST SERPL-CCNC: 26 U/L (ref 10–40)
BASOPHILS # BLD AUTO: 0.03 K/UL (ref 0–0.2)
BASOPHILS NFR BLD: 0.4 % (ref 0–1.9)
BILIRUB SERPL-MCNC: 0.7 MG/DL (ref 0.1–1)
BUN SERPL-MCNC: 11 MG/DL (ref 6–20)
CALCIUM SERPL-MCNC: 8.4 MG/DL (ref 8.7–10.5)
CHLORIDE SERPL-SCNC: 99 MMOL/L (ref 95–110)
CO2 SERPL-SCNC: 25 MMOL/L (ref 23–29)
CREAT SERPL-MCNC: 0.9 MG/DL (ref 0.5–1.4)
CRP SERPL-MCNC: 2.69 MG/DL
D DIMER PPP IA.FEU-MCNC: <0.27 UG/ML FEU
DIFFERENTIAL METHOD: ABNORMAL
EOSINOPHIL # BLD AUTO: 0 K/UL (ref 0–0.5)
EOSINOPHIL NFR BLD: 0 % (ref 0–8)
ERYTHROCYTE [DISTWIDTH] IN BLOOD BY AUTOMATED COUNT: 14.9 % (ref 11.5–14.5)
EST. GFR  (AFRICAN AMERICAN): >60 ML/MIN/1.73 M^2
EST. GFR  (NON AFRICAN AMERICAN): >60 ML/MIN/1.73 M^2
FERRITIN SERPL-MCNC: 151 NG/ML (ref 20–300)
GLUCOSE SERPL-MCNC: 111 MG/DL (ref 70–110)
HCT VFR BLD AUTO: 48.3 % (ref 40–54)
HGB BLD-MCNC: 16.6 G/DL (ref 14–18)
IMM GRANULOCYTES # BLD AUTO: 0.02 K/UL (ref 0–0.04)
IMM GRANULOCYTES NFR BLD AUTO: 0.3 % (ref 0–0.5)
LYMPHOCYTES # BLD AUTO: 2.3 K/UL (ref 1–4.8)
LYMPHOCYTES NFR BLD: 31 % (ref 18–48)
MCH RBC QN AUTO: 32.5 PG (ref 27–31)
MCHC RBC AUTO-ENTMCNC: 34.4 G/DL (ref 32–36)
MCV RBC AUTO: 95 FL (ref 82–98)
MONOCYTES # BLD AUTO: 1 K/UL (ref 0.3–1)
MONOCYTES NFR BLD: 13.7 % (ref 4–15)
NEUTROPHILS # BLD AUTO: 4 K/UL (ref 1.8–7.7)
NEUTROPHILS NFR BLD: 54.6 % (ref 38–73)
NRBC BLD-RTO: 0 /100 WBC
PLATELET # BLD AUTO: 334 K/UL (ref 150–350)
PMV BLD AUTO: 10.4 FL (ref 9.2–12.9)
POTASSIUM SERPL-SCNC: 3.8 MMOL/L (ref 3.5–5.1)
PROT SERPL-MCNC: 7.1 G/DL (ref 6–8.4)
RBC # BLD AUTO: 5.11 M/UL (ref 4.6–6.2)
SARS-COV-2 RNA RESP QL NAA+PROBE: NOT DETECTED
SODIUM SERPL-SCNC: 133 MMOL/L (ref 136–145)
WBC # BLD AUTO: 7.29 K/UL (ref 3.9–12.7)

## 2020-11-19 PROCEDURE — 85025 COMPLETE CBC W/AUTO DIFF WBC: CPT

## 2020-11-19 PROCEDURE — 25000003 PHARM REV CODE 250: Performed by: INTERNAL MEDICINE

## 2020-11-19 PROCEDURE — 85379 FIBRIN DEGRADATION QUANT: CPT

## 2020-11-19 PROCEDURE — 36415 COLL VENOUS BLD VENIPUNCTURE: CPT

## 2020-11-19 PROCEDURE — 86140 C-REACTIVE PROTEIN: CPT

## 2020-11-19 PROCEDURE — 63600175 PHARM REV CODE 636 W HCPCS: Performed by: INTERNAL MEDICINE

## 2020-11-19 PROCEDURE — 82728 ASSAY OF FERRITIN: CPT

## 2020-11-19 PROCEDURE — 80053 COMPREHEN METABOLIC PANEL: CPT

## 2020-11-19 RX ORDER — ONDANSETRON 4 MG/1
4 TABLET, FILM COATED ORAL EVERY 8 HOURS PRN
Qty: 10 TABLET | Refills: 0 | Status: SHIPPED | OUTPATIENT
Start: 2020-11-19 | End: 2020-11-22

## 2020-11-19 RX ORDER — ATORVASTATIN CALCIUM 10 MG/1
10 TABLET, FILM COATED ORAL DAILY
COMMUNITY
End: 2023-07-14 | Stop reason: CLARIF

## 2020-11-19 RX ORDER — DOXYCYCLINE 100 MG/1
100 CAPSULE ORAL EVERY 12 HOURS
Qty: 28 CAPSULE | Refills: 0 | Status: ON HOLD | OUTPATIENT
Start: 2020-11-19 | End: 2020-11-27 | Stop reason: HOSPADM

## 2020-11-19 RX ADMIN — ONDANSETRON 4 MG: 2 INJECTION INTRAMUSCULAR; INTRAVENOUS at 11:11

## 2020-11-19 RX ADMIN — ATORVASTATIN CALCIUM 10 MG: 10 TABLET, FILM COATED ORAL at 08:11

## 2020-11-19 RX ADMIN — CEFTRIAXONE 1 G: 1 INJECTION, SOLUTION INTRAVENOUS at 02:11

## 2020-11-19 RX ADMIN — REMDESIVIR 100 MG: 100 INJECTION, POWDER, LYOPHILIZED, FOR SOLUTION INTRAVENOUS at 11:11

## 2020-11-19 RX ADMIN — ACETAMINOPHEN 1000 MG: 500 TABLET, FILM COATED ORAL at 08:11

## 2020-11-19 RX ADMIN — DOXYCYCLINE HYCLATE 100 MG: 100 CAPSULE ORAL at 08:11

## 2020-11-19 NOTE — PLAN OF CARE
Chart reviewed no needs identified.     11/19/20 1039   Final Note   Assessment Type Final Discharge Note   Anticipated Discharge Disposition Home

## 2020-11-19 NOTE — DISCHARGE SUMMARY
[facility] Medicine  Discharge Summary      Patient Name: John Ball  MRN: [id number]  Admission Date: 11/15/2020  Hospital Length of Stay: 3 days  Discharge Date and Time:  11/19/2020 11:28 AM  Attending Physician: Bev Rutledge MD   Discharging Provider: Bev Rutledge MD  Primary Care Provider: Rodriguez Tang MD      HPI:   45 year old male (Hx hyperlipidemia) presented to ED complaining of 102.1 fever, and generalized malaise and fatigue. No known sick contacts. Is status post traumatic splenectomy. Was warned by ID to present to a facility if ever febrile with respiratory illness. Has dry cough. No CP/SOB. In ED: labs revealed  Mild leukocytosis with normal hematocrit; normal electrolytes and renal function. COVID and influenza negative. CXR: no infiltrate. His oxygen saturation was reportedly 92% (not noted in chart) improving to 95% with supplemental oxygen. He has had tachycardia since presentation 110's (sinus). Discussed with ED provider, early pneumonia may not show infiltrate yet on CXR. Tmax in ED: 100.4    * No surgery found *      Hospital Course:   11/16/2020  Pt continues to have fevers and medication is not helping it at present. He denies headache or stiff neck. Pt does have horses and cows requiring him to go in the jones.    [facility] assumed care on 11/17/20. Chart reviewed.  History of splenectomy, horse accident with rupture.  States initially developed headache, took 800 mg of ibuprofen with improvement, later in the day feeling generally unwell with malaise and then fever 101.  Given prior history and instructions, he took Levaquin and presented to the ED. Fever 102.8.  Admitted with Rocephin and azithromycin with Infectious Disease consultation and workup.  On 11/16 still with fever, azithromycin switched doxycycline, COVID send out test ordered.  On 11/17, fever yesterday to 101, intermittent shortness of breath especially with deep breaths, states he was  told [employer] member has now tested positive for COVID, admits he was not wearing his mask in [employer].  Following discussions with Infectious Disease given suspicion for COVID-19, he was empirically started on RDV (S received 3 doses in total), sent out COVID-19 was also negative.  On 11/18 patient with continued fever of 102.8 overnight, associated with shivering, during the daytime temperature is better, appears comfortable, no new symptoms however states about 1 week ago did have tingling sensation in his left air, othoscope examination was unremarkable, mild bloody nasal discharge which he feels is due to COVID swab testing.  On 11/19 has remained afebrile and clinically feels well, communicated with Infectious Disease, cleared for discharge with recommendations for 14 days of doxycycline, continued self quarantining for 7 days with COVID 19 IgG in 3 weeks.  Discharge instructions medication changes, follow-up as well as strict return precautions were explained.  No objection to discharge.    Discharge examination  Well-appearing, lying in bed no apparent distress, alert and oriented, not on supplemental oxygen with good air entry, regular heart rhythm    Discharge recommendations  Continue to self quarantine for 7 days  14 days course of doxycycline, advice to take upright with large glass of water  Blood tests for COVID IgG in 3 weeks time, results to Dr. Caruso  Work note provided     Consults:   Consults (From admission, onward)        Status Ordering Provider     Inpatient consult to Infectious Diseases  Once     Provider:  Catarina Caruso MD    Completed REESE SANDOVAL     Inpatient consult to Internal Medicine  Once     Provider:  Reese Jiang MD    Acknowledged KENNETH KAUR JR     Pharmacy Remdesivir Consult  Once     Provider:  (Not yet assigned)    Acknowledged CATARINA CARUSO     Pharmacy Remdesivir Consult  Once     Provider:  (Not yet assigned)    Acknowledged NEELA PACE           No new Assessment & Plan notes have been filed under this hospital service since the last note was generated.  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    Specialty: Emergency Medicine  Contact information:  [address] [facility] [address] [phone]  Additional information:  1st floor           Rodriguez Tang MD.    Specialty: Family Medicine  Contact information:  [address] [place] [address] [phone]                 Patient Instructions:      COVID-19 (SARS CoV-2) IgG Antibody   Standing Status: Future Standing Exp. Date: 01/18/22   Order Comments: In about 3 weeks time.  Results to be sent to Dr. Caruso     Order Specific Question Answer Comments   Diagnosis: Encounter for antibody response examination [[id number]]    Instructions: NON FASTING LAB [1074]      Diet Cardiac     Notify your health care provider if you experience any of the following:  temperature >100.4     Notify your health care provider if you experience any of the following:  persistent nausea and vomiting or diarrhea     Notify your health care provider if you experience any of the following:  redness, tenderness, or signs of infection (pain, swelling, redness, odor or green/yellow discharge around incision site)     Notify your health care provider if you experience any of the following:  difficulty breathing or increased cough     Notify your  health care provider if you experience any of the following:  increased confusion or weakness     Activity as tolerated   Order Comments: Continue self quarantining for 1 week       Significant Diagnostic Studies: Labs:   BMP:   Recent Labs   Lab 11/18/20  0647 11/19/20  0515   GLU 93 111*   * 133*   K 3.8 3.8    99   CO2 23 25   BUN 10 11   CREATININE 0.9 0.9   CALCIUM 8.8 8.4*   , CMP   Recent Labs   Lab 11/18/20  0647 11/19/20  0515   * 133*   K 3.8 3.8    99   CO2 23 25   GLU 93 111*   BUN 10 11   CREATININE 0.9 0.9   CALCIUM 8.8 8.4*   PROT 7.0 7.1   ALBUMIN 3.8 3.7   BILITOT 0.6 0.7   ALKPHOS 49* 45*   AST 28 26   ALT 30 25   ANIONGAP 8 9   ESTGFRAFRICA >60.0 >60.0   EGFRNONAA >60.0 >60.0   , CBC   Recent Labs   Lab 11/18/20  0647 11/19/20  0515   WBC 11.36 7.29   HGB 16.7 16.6   HCT 49.2 48.3    334   , INR No results found for: INR, PROTIME, Lipid Panel   Lab Results   Component Value Date    CHOL 144 07/23/2019    HDL 43 07/23/2019    LDLCALC 69.2 07/23/2019    TRIG 159 (H) 07/23/2019    CHOLHDL 29.9 07/23/2019   , Troponin No results for input(s): TROPONINI in the last 168 hours. and A1C: No results for input(s): HGBA1C in the last 4320 hours.    Pending Diagnostic Studies:     None       X-ray Chest Ap Portable    Result Date: 11/18/2020  XR CHEST AP PORTABLE CLINICAL HISTORY: 45 years Male fever COMPARISON: November 15, 2020 FINDINGS: Cardiac silhouette size is within normal limits. No airspace consolidation. No pleural effusion or pneumothorax. No acute osseous abnormality. IMPRESSION: No acute pulmonary process. Electronically Signed by Raj JAMESON on 11/18/2020 3:31 PM    X-ray Chest Ap Portable    Result Date: 11/15/2020  PROCEDURE:   XR CHEST AP PORTABLE  dated  11/15/2020 9:42 PM CLINICAL HISTORY:   Male 45 years of age.   pain TECHNIQUE: AP view of the chest obtained portably at 9:42 PM PREVIOUS STUDIES:  October 16, 2020 FINDINGS: The heart is not  enlarged. Mediastinal, cardiac and hilar contours are normal. Lungs are clear. There is no pleural effusion or pneumothorax. Surgical clips project over the periphery of the left upper quadrant. Bones are unremarkable. IMPRESSION: No acute findings. Stable compared with the prior study. Normal size heart. Clear lungs. Electronically Signed by Hemalatha Hinojosa on 11/16/2020 7:25 AM    Xr Chest_stennis Performed    Result Date: 10/30/2020  EXAMINATION: XR CHEST_STENNIS PERFORMED TECHNIQUE: Dual subtraction radiography is performed of the chest. COMPARISON: Chest x-ray of February 16, 2019. FINDINGS: The mediastinal and cardiac size and contours are normal.  The diaphragms of pleura are clear.  There are no intrapulmonary masses or infiltrates.  There is no pneumothorax or pleural effusion.     Negative chest x-ray. Electronically signed by: Rodriguez Porter MD Date:    10/30/2020 Time:    09:40    Medications:  Reconciled Home Medications:      Medication List      START taking these medications    doxycycline 100 MG Cap  Commonly known as: VIBRAMYCIN  Take 1 capsule (100 mg total) by mouth every 12 (twelve) hours. for 14 days     ondansetron 4 MG tablet  Commonly known as: ZOFRAN  Take 1 tablet (4 mg total) by mouth every 8 (eight) hours as needed for Nausea.        CONTINUE taking these medications    atorvastatin 10 MG tablet  Commonly known as: LIPITOR  Take 10 mg by mouth once daily.        STOP taking these medications    aspirin 81 MG EC tablet  Commonly known as: ECOTRIN     ONE DAILY MULTIVITAMIN per tablet  Generic drug: multivitamin     oxyCODONE-acetaminophen  mg per tablet  Commonly known as: PERCOCET            Indwelling Lines/Drains at time of discharge:   Lines/Drains/Airways     None                 Time spent on the discharge of patient: 32 minutes  Patient was seen and examined on the date of discharge and determined to be suitable for discharge.         Bev Rutledge MD  Department of  [facility] Medicine  Former[facility]

## 2020-11-19 NOTE — CARE UPDATE
11/19/2020    Patient: John Ball    YOB: 1975    To whom it may concern,    John Ball was admitted to the hospital on 11/15/2020 and was discharged on 11/19/2020. Patient was under my care at the hospital. He was advised to empirically continue self quarantining for 7 days from the day of discharge after which he is cleared to return to work with no restrictions. If you have any questions or concerns, please do not hesitate to contact the department of hospital medicine at [facility] at [phone].     Sincerely,    Bev Rutledge MD    Department of Hospital Medicine

## 2020-11-19 NOTE — NURSING
Pt medicated for pain.  He states the back of his head hurts when he gets a fever.  Pt states he has been practicing his deep breathing.

## 2020-11-19 NOTE — PROGRESS NOTES
"[facility]  Adult Nutrition   Progress Note (Initial Assessment)     SUMMARY     Recommendations/Interventions:    1. RD to recommend liberalization of diet to least restrictive if MD agrees.  2. Ensure Enlive BID (700 kcal, 40 g protein) to assist in meeting estimated energy and protein needs.  Goals: 1. Pt to meet at least 75% of estimated energy and protein needs.  Nutrition Goal Status: new    Interval History:  · Pt seen 2' to length of stay. Pt presents with fever and possible COVID-19 infection. Talked to pt via telephone. Pt reported having not a good appetite, stating eating 50% or less of meals. Pt is open to trying an oral supplement to help meet needs. Pt also voice wishes to be taken off a cardiac diet- if MD agrees pt to be put on the least restrictive diet.     Reason for Assessment    Reason For Assessment: length of stay  Diagnosis: other (see comments)(Fever, suspected COVID-19 virus infection)  Relevant Medical History: Fever, suspected COVID-19 virus infection, hyperlipidemia, hyponatremia, asplenia after surgical procedure  Interdisciplinary Rounds: attended    Nutrition Risk Screen    Nutrition Risk Screen: no indicators present    Nutrition/Diet History    Food Allergies: NKFA    Anthropometrics    Temp: 98.1 °F (36.7 °C)  Height Method: Stated  Height: 5' 7" (170.2 cm)  Height (inches): 67 in  Weight Method: Bed Scale  Weight: 92.4 kg (203 lb 11.3 oz)  Weight (lb): 203.71 lb  Ideal Body Weight (IBW), Male: 148 lb  % Ideal Body Weight, Male (lb): 137.64 %  BMI (Calculated): 31.9       Weight History:  Wt Readings from Last 10 Encounters:   11/16/20 92.4 kg (203 lb 11.3 oz)   03/05/18 83 kg (183 lb)   03/02/18 83 kg (183 lb)   05/05/12 77.1 kg (170 lb)     Lab/Procedures/Meds: Pertinent Labs Reviewed     11/17/2020 06:11 11/18/2020 06:47 11/19/2020 05:15   Sodium 135 (L) 133 (L) 133 (L)   Potassium 4.1 3.8 3.8   Chloride 102 102 99   CO2 24 23 25   Anion Gap 9 8 9   BUN 6 10 11 "   Creatinine 0.9 0.9 0.9   eGFR if non African American >60.0 >60.0 >60.0   eGFR if African American >60.0 >60.0 >60.0   Glucose 103 93 111 (H)   Calcium 9.1 8.8 8.4 (L)   Alkaline Phosphatase 52 (L) 49 (L) 45 (L)   PROTEIN TOTAL 7.4 7.0 7.1   Albumin 4.2 3.8 3.7   BILIRUBIN TOTAL 0.8 0.6 0.7   AST 28 28 26   ALT 32 30 25   CRP 2.99 (H) 3.36 (H) 2.69 (H)       Medications:Pertinent Medications Reviewed  Scheduled Meds:   atorvastatin  10 mg Oral Daily    cefTRIAXone (ROCEPHIN) IVPB  1 g Intravenous Q24H    doxycycline  100 mg Oral Q12H    EUA remdesivir infusion  100 mg Intravenous Q24H     Continuous Infusions:  PRN Meds:.acetaminophen, albuterol sulfate, calcium chloride IVPB, calcium chloride IVPB, calcium chloride IVPB, ibuprofen, magnesium oxide, magnesium sulfate IVPB, magnesium sulfate IVPB, magnesium sulfate IVPB, magnesium sulfate IVPB, ondansetron, oxyCODONE-acetaminophen, potassium chloride in water, potassium chloride in water, potassium chloride in water, potassium chloride in water, potassium chloride, potassium chloride, potassium chloride, potassium chloride, sodium phosphate IVPB, sodium phosphate IVPB, sodium phosphate IVPB, sodium phosphate IVPB, sodium phosphate IVPB    Estimated/Assessed Needs    Weight Used For Calorie Calculations: 92.4 kg (203 lb 11.3 oz)  Energy Calorie Requirements (kcal): 6162-3551 kcal/day (20-25 kcal/kg)  Energy Need Method: Kcal/kg  Protein Requirements: 101-135 g/day (1.5-2 g/kg IBW)  Weight Used For Protein Calculations: 67.3 kg (148 lb 5.9 oz)  Fluid Requirements (mL): 0083-9508 mL  Estimated Fluid Requirement Method: RDA Method    Nutrition Prescription Ordered    Current Diet Order: Cardiac diet    Evaluation of Received Nutrient/Fluid Intake    Energy Calories Required: not meeting needs  Protein Required: not meeting needs  Fluid Required: meeting needs  % Intake of Estimated Energy Needs: 25 - 50 %  % Meal Intake: 25 - 50 %    Intake/Output Summary (Last 24  hours) at 11/19/2020 0926  Last data filed at 11/19/2020 0230  Gross per 24 hour   Intake 300 ml   Output --   Net 300 ml      Nutrition Risk    Level of Risk/Frequency of Follow-up: high     Monitor and Evaluation    Food and Nutrient Intake: food and beverage intake  Food and Nutrient Adminstration: diet order  Knowledge/Beliefs/Attitudes: food and nutrition knowledge/skill, beliefs and attitudes  Physical Activity and Function: nutrition-related ADLs and IADLs, factors affecting access to physical activity  Anthropometric Measurements: weight, weight change, body mass index  Biochemical Data, Medical Tests and Procedures: electrolyte and renal panel, gastrointestinal profile, glucose/endocrine profile, inflammatory profile, lipid profile  Nutrition-Focused Physical Findings: other (specify)(Unable to see pt in person due to COVID-19 protocol.)     Nutrition Follow-Up    RD Follow-up?: Yes   Ruben Ochoa 11/19/202010:22 AM

## 2020-11-19 NOTE — RESPIRATORY THERAPY
11/18/20 2055   PRE-TX-O2   O2 Device (Oxygen Therapy) room air   SpO2 97 %   Pulse Oximetry Type Intermittent   $ Pulse Oximetry - Single Charge Pulse Oximetry - Single   Pulse 103   Resp 18   Aerosol Therapy   $ Aerosol Therapy Charges PRN treatment not required   Education   $ Education 15 min

## 2020-11-21 LAB
BACTERIA BLD CULT: NORMAL
BACTERIA BLD CULT: NORMAL
BACTERIA UR CULT: NO GROWTH

## 2020-11-23 LAB — BACTERIA BLD CULT: NORMAL

## 2020-11-24 ENCOUNTER — HOSPITAL ENCOUNTER (INPATIENT)
Facility: HOSPITAL | Age: 45
LOS: 3 days | Discharge: HOME | DRG: 177 | End: 2020-11-27
Attending: EMERGENCY MEDICINE | Admitting: INTERNAL MEDICINE
Payer: COMMERCIAL

## 2020-11-24 DIAGNOSIS — Q89.01 ASPLENIA: ICD-10-CM

## 2020-11-24 DIAGNOSIS — U07.1 PNEUMONIA DUE TO COVID-19 VIRUS: ICD-10-CM

## 2020-11-24 DIAGNOSIS — R50.9 FEVER: ICD-10-CM

## 2020-11-24 DIAGNOSIS — R50.9 FEVER, UNSPECIFIED FEVER CAUSE: ICD-10-CM

## 2020-11-24 DIAGNOSIS — U07.1 COVID-19 VIRUS INFECTION: Primary | ICD-10-CM

## 2020-11-24 DIAGNOSIS — J12.82 PNEUMONIA DUE TO COVID-19 VIRUS: ICD-10-CM

## 2020-11-24 PROBLEM — J18.9 PNEUMONIA: Status: ACTIVE | Noted: 2020-11-24

## 2020-11-24 LAB
ALBUMIN SERPL BCP-MCNC: 4.2 G/DL (ref 3.5–5.2)
ALP SERPL-CCNC: 49 U/L (ref 55–135)
ALT SERPL W/O P-5'-P-CCNC: 25 U/L (ref 10–44)
ANION GAP SERPL CALC-SCNC: 10 MMOL/L (ref 8–16)
AST SERPL-CCNC: 25 U/L (ref 10–40)
BASOPHILS # BLD AUTO: 0.06 K/UL (ref 0–0.2)
BASOPHILS NFR BLD: 0.5 % (ref 0–1.9)
BILIRUB SERPL-MCNC: 0.6 MG/DL (ref 0.1–1)
BUN SERPL-MCNC: 12 MG/DL (ref 6–20)
CALCIUM SERPL-MCNC: 9.4 MG/DL (ref 8.7–10.5)
CHLORIDE SERPL-SCNC: 105 MMOL/L (ref 95–110)
CK SERPL-CCNC: 73 U/L (ref 20–200)
CO2 SERPL-SCNC: 25 MMOL/L (ref 23–29)
CREAT SERPL-MCNC: 0.9 MG/DL (ref 0.5–1.4)
CRP SERPL-MCNC: 2.83 MG/DL
D DIMER PPP IA.FEU-MCNC: 0.39 UG/ML FEU
DIFFERENTIAL METHOD: ABNORMAL
EOSINOPHIL # BLD AUTO: 0 K/UL (ref 0–0.5)
EOSINOPHIL NFR BLD: 0 % (ref 0–8)
ERYTHROCYTE [DISTWIDTH] IN BLOOD BY AUTOMATED COUNT: 15.1 % (ref 11.5–14.5)
EST. GFR  (AFRICAN AMERICAN): >60 ML/MIN/1.73 M^2
EST. GFR  (NON AFRICAN AMERICAN): >60 ML/MIN/1.73 M^2
GLUCOSE SERPL-MCNC: 100 MG/DL (ref 70–110)
HCT VFR BLD AUTO: 48.1 % (ref 40–54)
HGB BLD-MCNC: 16.5 G/DL (ref 14–18)
IMM GRANULOCYTES # BLD AUTO: 0.13 K/UL (ref 0–0.04)
IMM GRANULOCYTES NFR BLD AUTO: 1.1 % (ref 0–0.5)
INFLUENZA A, MOLECULAR: NEGATIVE
INFLUENZA B, MOLECULAR: NEGATIVE
LACTATE SERPL-SCNC: 0.9 MMOL/L (ref 0.5–1.9)
LDH SERPL L TO P-CCNC: 159 U/L (ref 110–260)
LYMPHOCYTES # BLD AUTO: 2.5 K/UL (ref 1–4.8)
LYMPHOCYTES NFR BLD: 21.2 % (ref 18–48)
MCH RBC QN AUTO: 32.7 PG (ref 27–31)
MCHC RBC AUTO-ENTMCNC: 34.3 G/DL (ref 32–36)
MCV RBC AUTO: 95 FL (ref 82–98)
MONOCYTES # BLD AUTO: 1.4 K/UL (ref 0.3–1)
MONOCYTES NFR BLD: 12.2 % (ref 4–15)
NEUTROPHILS # BLD AUTO: 7.6 K/UL (ref 1.8–7.7)
NEUTROPHILS NFR BLD: 65 % (ref 38–73)
NRBC BLD-RTO: 0 /100 WBC
PLATELET # BLD AUTO: 401 K/UL (ref 150–350)
PMV BLD AUTO: 10.1 FL (ref 9.2–12.9)
POTASSIUM SERPL-SCNC: 4.1 MMOL/L (ref 3.5–5.1)
PROT SERPL-MCNC: 7.6 G/DL (ref 6–8.4)
RBC # BLD AUTO: 5.05 M/UL (ref 4.6–6.2)
SARS-COV-2 RDRP RESP QL NAA+PROBE: POSITIVE
SODIUM SERPL-SCNC: 140 MMOL/L (ref 136–145)
SPECIMEN SOURCE: NORMAL
WBC # BLD AUTO: 11.75 K/UL (ref 3.9–12.7)

## 2020-11-24 PROCEDURE — 93010 ELECTROCARDIOGRAM REPORT: CPT | Mod: ,,, | Performed by: INTERNAL MEDICINE

## 2020-11-24 PROCEDURE — 86769 SARS-COV-2 COVID-19 ANTIBODY: CPT

## 2020-11-24 PROCEDURE — U0002 COVID-19 LAB TEST NON-CDC: HCPCS

## 2020-11-24 PROCEDURE — 82550 ASSAY OF CK (CPK): CPT

## 2020-11-24 PROCEDURE — 84484 ASSAY OF TROPONIN QUANT: CPT

## 2020-11-24 PROCEDURE — 87502 INFLUENZA DNA AMP PROBE: CPT

## 2020-11-24 PROCEDURE — 85379 FIBRIN DEGRADATION QUANT: CPT

## 2020-11-24 PROCEDURE — 93010 EKG 12-LEAD: ICD-10-PCS | Mod: ,,, | Performed by: INTERNAL MEDICINE

## 2020-11-24 PROCEDURE — 99285 EMERGENCY DEPT VISIT HI MDM: CPT | Mod: 25

## 2020-11-24 PROCEDURE — 83605 ASSAY OF LACTIC ACID: CPT

## 2020-11-24 PROCEDURE — 82728 ASSAY OF FERRITIN: CPT

## 2020-11-24 PROCEDURE — 12000002 HC ACUTE/MED SURGE SEMI-PRIVATE ROOM

## 2020-11-24 PROCEDURE — 83615 LACTATE (LD) (LDH) ENZYME: CPT

## 2020-11-24 PROCEDURE — 36415 COLL VENOUS BLD VENIPUNCTURE: CPT

## 2020-11-24 PROCEDURE — 86140 C-REACTIVE PROTEIN: CPT

## 2020-11-24 PROCEDURE — 80053 COMPREHEN METABOLIC PANEL: CPT

## 2020-11-24 PROCEDURE — 21400001 HC TELEMETRY ROOM

## 2020-11-24 PROCEDURE — 93005 ELECTROCARDIOGRAM TRACING: CPT | Performed by: INTERNAL MEDICINE

## 2020-11-24 PROCEDURE — 87040 BLOOD CULTURE FOR BACTERIA: CPT | Mod: 59

## 2020-11-24 PROCEDURE — 84145 PROCALCITONIN (PCT): CPT

## 2020-11-24 PROCEDURE — 85025 COMPLETE CBC W/AUTO DIFF WBC: CPT

## 2020-11-24 RX ORDER — IBUPROFEN 200 MG
24 TABLET ORAL
Status: DISCONTINUED | OUTPATIENT
Start: 2020-11-25 | End: 2020-11-27 | Stop reason: HOSPADM

## 2020-11-24 RX ORDER — ASCORBIC ACID 500 MG
500 TABLET ORAL 2 TIMES DAILY
Status: DISCONTINUED | OUTPATIENT
Start: 2020-11-25 | End: 2020-11-27 | Stop reason: HOSPADM

## 2020-11-24 RX ORDER — TALC
6 POWDER (GRAM) TOPICAL NIGHTLY PRN
Status: DISCONTINUED | OUTPATIENT
Start: 2020-11-25 | End: 2020-11-27 | Stop reason: HOSPADM

## 2020-11-24 RX ORDER — CHOLECALCIFEROL (VITAMIN D3) 25 MCG
1000 TABLET ORAL DAILY
Status: DISCONTINUED | OUTPATIENT
Start: 2020-11-25 | End: 2020-11-27 | Stop reason: HOSPADM

## 2020-11-24 RX ORDER — SODIUM CHLORIDE 0.9 % (FLUSH) 0.9 %
10 SYRINGE (ML) INJECTION
Status: DISCONTINUED | OUTPATIENT
Start: 2020-11-25 | End: 2020-11-27 | Stop reason: HOSPADM

## 2020-11-24 RX ORDER — ACETAMINOPHEN 500 MG
1000 TABLET ORAL
Status: COMPLETED | OUTPATIENT
Start: 2020-11-25 | End: 2020-11-25

## 2020-11-24 RX ORDER — GUAIFENESIN/DEXTROMETHORPHAN 100-10MG/5
10 SYRUP ORAL EVERY 4 HOURS PRN
Status: DISCONTINUED | OUTPATIENT
Start: 2020-11-25 | End: 2020-11-27 | Stop reason: HOSPADM

## 2020-11-24 RX ORDER — ATORVASTATIN CALCIUM 10 MG/1
10 TABLET, FILM COATED ORAL DAILY
Status: DISCONTINUED | OUTPATIENT
Start: 2020-11-25 | End: 2020-11-27 | Stop reason: HOSPADM

## 2020-11-24 RX ORDER — ONDANSETRON 2 MG/ML
4 INJECTION INTRAMUSCULAR; INTRAVENOUS EVERY 8 HOURS PRN
Status: DISCONTINUED | OUTPATIENT
Start: 2020-11-25 | End: 2020-11-27 | Stop reason: HOSPADM

## 2020-11-24 RX ORDER — ACETAMINOPHEN 325 MG/1
650 TABLET ORAL EVERY 8 HOURS PRN
Status: DISCONTINUED | OUTPATIENT
Start: 2020-11-25 | End: 2020-11-27 | Stop reason: HOSPADM

## 2020-11-24 RX ORDER — GLUCAGON 1 MG
1 KIT INJECTION
Status: DISCONTINUED | OUTPATIENT
Start: 2020-11-25 | End: 2020-11-27 | Stop reason: HOSPADM

## 2020-11-24 RX ORDER — PANTOPRAZOLE SODIUM 40 MG/1
40 TABLET, DELAYED RELEASE ORAL NIGHTLY
COMMUNITY

## 2020-11-24 RX ORDER — HYDROCODONE BITARTRATE AND ACETAMINOPHEN 5; 325 MG/1; MG/1
1 TABLET ORAL EVERY 6 HOURS PRN
Status: DISCONTINUED | OUTPATIENT
Start: 2020-11-25 | End: 2020-11-27 | Stop reason: HOSPADM

## 2020-11-24 RX ORDER — IBUPROFEN 200 MG
16 TABLET ORAL
Status: DISCONTINUED | OUTPATIENT
Start: 2020-11-25 | End: 2020-11-27 | Stop reason: HOSPADM

## 2020-11-24 RX ORDER — ENOXAPARIN SODIUM 100 MG/ML
40 INJECTION SUBCUTANEOUS EVERY 24 HOURS
Status: DISCONTINUED | OUTPATIENT
Start: 2020-11-25 | End: 2020-11-27 | Stop reason: HOSPADM

## 2020-11-24 RX ORDER — PANTOPRAZOLE SODIUM 40 MG/1
40 TABLET, DELAYED RELEASE ORAL EVERY MORNING
Status: DISCONTINUED | OUTPATIENT
Start: 2020-11-25 | End: 2020-11-27 | Stop reason: HOSPADM

## 2020-11-24 RX ORDER — ALBUTEROL SULFATE 90 UG/1
2 AEROSOL, METERED RESPIRATORY (INHALATION) EVERY 6 HOURS
Status: DISCONTINUED | OUTPATIENT
Start: 2020-11-25 | End: 2020-11-25

## 2020-11-25 LAB
ALBUMIN SERPL BCP-MCNC: 3.7 G/DL (ref 3.5–5.2)
ALP SERPL-CCNC: 47 U/L (ref 55–135)
ALT SERPL W/O P-5'-P-CCNC: 24 U/L (ref 10–44)
ANION GAP SERPL CALC-SCNC: 11 MMOL/L (ref 8–16)
AST SERPL-CCNC: 23 U/L (ref 10–40)
BASOPHILS # BLD AUTO: 0.03 K/UL (ref 0–0.2)
BASOPHILS NFR BLD: 0.3 % (ref 0–1.9)
BILIRUB SERPL-MCNC: 0.7 MG/DL (ref 0.1–1)
BILIRUB UR QL STRIP: ABNORMAL
BLD PROD TYP BPU: NORMAL
BLD PROD TYP BPU: NORMAL
BLOOD UNIT EXPIRATION DATE: NORMAL
BLOOD UNIT EXPIRATION DATE: NORMAL
BLOOD UNIT TYPE CODE: 6200
BLOOD UNIT TYPE CODE: 6200
BLOOD UNIT TYPE: NORMAL
BLOOD UNIT TYPE: NORMAL
BUN SERPL-MCNC: 13 MG/DL (ref 6–20)
CALCIUM SERPL-MCNC: 8.5 MG/DL (ref 8.7–10.5)
CHLORIDE SERPL-SCNC: 102 MMOL/L (ref 95–110)
CLARITY UR: CLEAR
CO2 SERPL-SCNC: 24 MMOL/L (ref 23–29)
CODING SYSTEM: NORMAL
CODING SYSTEM: NORMAL
COLOR UR: ABNORMAL
CREAT SERPL-MCNC: 0.8 MG/DL (ref 0.5–1.4)
CRP SERPL-MCNC: 3.76 MG/DL
DIFFERENTIAL METHOD: ABNORMAL
DISPENSE STATUS: NORMAL
DISPENSE STATUS: NORMAL
EOSINOPHIL # BLD AUTO: 0 K/UL (ref 0–0.5)
EOSINOPHIL NFR BLD: 0 % (ref 0–8)
ERYTHROCYTE [DISTWIDTH] IN BLOOD BY AUTOMATED COUNT: 14.8 % (ref 11.5–14.5)
EST. GFR  (AFRICAN AMERICAN): >60 ML/MIN/1.73 M^2
EST. GFR  (NON AFRICAN AMERICAN): >60 ML/MIN/1.73 M^2
FERRITIN SERPL-MCNC: 392 NG/ML (ref 20–300)
GLUCOSE SERPL-MCNC: 105 MG/DL (ref 70–110)
GLUCOSE UR QL STRIP: NEGATIVE
HCT VFR BLD AUTO: 45.2 % (ref 40–54)
HGB BLD-MCNC: 15.2 G/DL (ref 14–18)
HGB UR QL STRIP: NEGATIVE
IMM GRANULOCYTES # BLD AUTO: 0.15 K/UL (ref 0–0.04)
IMM GRANULOCYTES NFR BLD AUTO: 1.3 % (ref 0–0.5)
KETONES UR QL STRIP: ABNORMAL
LEUKOCYTE ESTERASE UR QL STRIP: NEGATIVE
LYMPHOCYTES # BLD AUTO: 1.7 K/UL (ref 1–4.8)
LYMPHOCYTES NFR BLD: 15 % (ref 18–48)
MAGNESIUM SERPL-MCNC: 2 MG/DL (ref 1.6–2.6)
MCH RBC QN AUTO: 31.7 PG (ref 27–31)
MCHC RBC AUTO-ENTMCNC: 33.6 G/DL (ref 32–36)
MCV RBC AUTO: 94 FL (ref 82–98)
MONOCYTES # BLD AUTO: 1.3 K/UL (ref 0.3–1)
MONOCYTES NFR BLD: 11.7 % (ref 4–15)
NEUTROPHILS # BLD AUTO: 8 K/UL (ref 1.8–7.7)
NEUTROPHILS NFR BLD: 71.7 % (ref 38–73)
NITRITE UR QL STRIP: NEGATIVE
NRBC BLD-RTO: 0 /100 WBC
PH UR STRIP: 6 [PH] (ref 5–8)
PHOSPHATE SERPL-MCNC: 3.5 MG/DL (ref 2.7–4.5)
PLATELET # BLD AUTO: 420 K/UL (ref 150–350)
PMV BLD AUTO: 10.6 FL (ref 9.2–12.9)
POTASSIUM SERPL-SCNC: 3.7 MMOL/L (ref 3.5–5.1)
PROCALCITONIN SERPL IA-MCNC: <0.05 NG/ML (ref 0–0.5)
PROT SERPL-MCNC: 7.3 G/DL (ref 6–8.4)
PROT UR QL STRIP: ABNORMAL
RBC # BLD AUTO: 4.8 M/UL (ref 4.6–6.2)
SARS-COV-2 IGG SERPLBLD QL IA.RAPID: NEGATIVE
SODIUM SERPL-SCNC: 137 MMOL/L (ref 136–145)
SP GR UR STRIP: 1.02 (ref 1–1.03)
TROPONIN I SERPL DL<=0.01 NG/ML-MCNC: <0.03 NG/ML
UNIT NUMBER: NORMAL
UNIT NUMBER: NORMAL
URN SPEC COLLECT METH UR: ABNORMAL
UROBILINOGEN UR STRIP-ACNC: NEGATIVE EU/DL
WBC # BLD AUTO: 11.15 K/UL (ref 3.9–12.7)

## 2020-11-25 PROCEDURE — 36415 COLL VENOUS BLD VENIPUNCTURE: CPT

## 2020-11-25 PROCEDURE — 94640 AIRWAY INHALATION TREATMENT: CPT

## 2020-11-25 PROCEDURE — 12000002 HC ACUTE/MED SURGE SEMI-PRIVATE ROOM

## 2020-11-25 PROCEDURE — 94761 N-INVAS EAR/PLS OXIMETRY MLT: CPT

## 2020-11-25 PROCEDURE — 99900031 HC PATIENT EDUCATION (STAT)

## 2020-11-25 PROCEDURE — 99900035 HC TECH TIME PER 15 MIN (STAT)

## 2020-11-25 PROCEDURE — 25000003 PHARM REV CODE 250: Performed by: NURSE PRACTITIONER

## 2020-11-25 PROCEDURE — 94799 UNLISTED PULMONARY SVC/PX: CPT

## 2020-11-25 PROCEDURE — 99255 PR INITIAL INPATIENT CONSULT,LEVL V: ICD-10-PCS | Mod: ,,, | Performed by: INTERNAL MEDICINE

## 2020-11-25 PROCEDURE — 25000242 PHARM REV CODE 250 ALT 637 W/ HCPCS: Performed by: NURSE PRACTITIONER

## 2020-11-25 PROCEDURE — 63600175 PHARM REV CODE 636 W HCPCS: Performed by: NURSE PRACTITIONER

## 2020-11-25 PROCEDURE — 63600175 PHARM REV CODE 636 W HCPCS: Performed by: INTERNAL MEDICINE

## 2020-11-25 PROCEDURE — 25000003 PHARM REV CODE 250: Performed by: INTERNAL MEDICINE

## 2020-11-25 PROCEDURE — 80053 COMPREHEN METABOLIC PANEL: CPT

## 2020-11-25 PROCEDURE — 25000003 PHARM REV CODE 250: Performed by: EMERGENCY MEDICINE

## 2020-11-25 PROCEDURE — 25000242 PHARM REV CODE 250 ALT 637 W/ HCPCS: Performed by: INTERNAL MEDICINE

## 2020-11-25 PROCEDURE — 83735 ASSAY OF MAGNESIUM: CPT

## 2020-11-25 PROCEDURE — 86140 C-REACTIVE PROTEIN: CPT

## 2020-11-25 PROCEDURE — 99255 IP/OBS CONSLTJ NEW/EST HI 80: CPT | Mod: ,,, | Performed by: INTERNAL MEDICINE

## 2020-11-25 PROCEDURE — 81003 URINALYSIS AUTO W/O SCOPE: CPT

## 2020-11-25 PROCEDURE — 85025 COMPLETE CBC W/AUTO DIFF WBC: CPT

## 2020-11-25 PROCEDURE — 63600175 PHARM REV CODE 636 W HCPCS: Performed by: EMERGENCY MEDICINE

## 2020-11-25 PROCEDURE — 84100 ASSAY OF PHOSPHORUS: CPT

## 2020-11-25 RX ORDER — ALBUTEROL SULFATE 90 UG/1
2 AEROSOL, METERED RESPIRATORY (INHALATION) 2 TIMES DAILY
Status: DISCONTINUED | OUTPATIENT
Start: 2020-11-25 | End: 2020-11-26

## 2020-11-25 RX ORDER — ACETAMINOPHEN 500 MG
1000 TABLET ORAL
Status: DISCONTINUED | OUTPATIENT
Start: 2020-11-25 | End: 2020-11-27 | Stop reason: HOSPADM

## 2020-11-25 RX ORDER — SODIUM CHLORIDE 9 MG/ML
INJECTION, SOLUTION INTRAVENOUS CONTINUOUS
Status: ACTIVE | OUTPATIENT
Start: 2020-11-25 | End: 2020-11-25

## 2020-11-25 RX ORDER — DIPHENHYDRAMINE HYDROCHLORIDE 50 MG/ML
25 INJECTION INTRAMUSCULAR; INTRAVENOUS
Status: DISCONTINUED | OUTPATIENT
Start: 2020-11-25 | End: 2020-11-27 | Stop reason: HOSPADM

## 2020-11-25 RX ORDER — DEXAMETHASONE 2 MG/1
6 TABLET ORAL DAILY
Status: DISCONTINUED | OUTPATIENT
Start: 2020-11-25 | End: 2020-11-27 | Stop reason: HOSPADM

## 2020-11-25 RX ORDER — HYDROCODONE BITARTRATE AND ACETAMINOPHEN 500; 5 MG/1; MG/1
TABLET ORAL
Status: DISCONTINUED | OUTPATIENT
Start: 2020-11-25 | End: 2020-11-27 | Stop reason: HOSPADM

## 2020-11-25 RX ADMIN — DIPHENHYDRAMINE HYDROCHLORIDE 25 MG: 50 INJECTION INTRAMUSCULAR; INTRAVENOUS at 12:11

## 2020-11-25 RX ADMIN — OXYCODONE HYDROCHLORIDE AND ACETAMINOPHEN 500 MG: 500 TABLET ORAL at 09:11

## 2020-11-25 RX ADMIN — MELATONIN 6 MG: at 11:11

## 2020-11-25 RX ADMIN — ENOXAPARIN SODIUM 40 MG: 40 INJECTION SUBCUTANEOUS at 05:11

## 2020-11-25 RX ADMIN — MELATONIN 6 MG: at 01:11

## 2020-11-25 RX ADMIN — DEXAMETHASONE 6 MG: 2 TABLET ORAL at 09:11

## 2020-11-25 RX ADMIN — ALBUTEROL SULFATE 2 PUFF: 90 AEROSOL, METERED RESPIRATORY (INHALATION) at 12:11

## 2020-11-25 RX ADMIN — CEFTRIAXONE 1 G: 1 INJECTION, SOLUTION INTRAVENOUS at 12:11

## 2020-11-25 RX ADMIN — HYDROCODONE BITARTRATE AND ACETAMINOPHEN 1 TABLET: 5; 325 TABLET ORAL at 01:11

## 2020-11-25 RX ADMIN — ACETAMINOPHEN 650 MG: 325 TABLET, FILM COATED ORAL at 01:11

## 2020-11-25 RX ADMIN — Medication 1000 UNITS: at 09:11

## 2020-11-25 RX ADMIN — IPRATROPIUM BROMIDE 2 PUFF: 17 AEROSOL, METERED RESPIRATORY (INHALATION) at 12:11

## 2020-11-25 RX ADMIN — IPRATROPIUM BROMIDE 2 PUFF: 17 AEROSOL, METERED RESPIRATORY (INHALATION) at 08:11

## 2020-11-25 RX ADMIN — IPRATROPIUM BROMIDE 2 PUFF: 17 AEROSOL, METERED RESPIRATORY (INHALATION) at 07:11

## 2020-11-25 RX ADMIN — OXYCODONE HYDROCHLORIDE AND ACETAMINOPHEN 500 MG: 500 TABLET ORAL at 08:11

## 2020-11-25 RX ADMIN — ATORVASTATIN CALCIUM 10 MG: 10 TABLET, FILM COATED ORAL at 08:11

## 2020-11-25 RX ADMIN — ACETAMINOPHEN 1000 MG: 500 TABLET, FILM COATED ORAL at 12:11

## 2020-11-25 RX ADMIN — ONDANSETRON 4 MG: 2 INJECTION INTRAMUSCULAR; INTRAVENOUS at 01:11

## 2020-11-25 RX ADMIN — SODIUM CHLORIDE: 0.9 INJECTION, SOLUTION INTRAVENOUS at 09:11

## 2020-11-25 RX ADMIN — PANTOPRAZOLE SODIUM 40 MG: 40 TABLET, DELAYED RELEASE ORAL at 05:11

## 2020-11-25 RX ADMIN — ALBUTEROL SULFATE 2 PUFF: 90 AEROSOL, METERED RESPIRATORY (INHALATION) at 07:11

## 2020-11-25 RX ADMIN — THERA TABS 1 TABLET: TAB at 09:11

## 2020-11-25 RX ADMIN — REMDESIVIR 200 MG: 100 INJECTION, POWDER, LYOPHILIZED, FOR SOLUTION INTRAVENOUS at 09:11

## 2020-11-25 RX ADMIN — ALBUTEROL SULFATE 2 PUFF: 90 AEROSOL, METERED RESPIRATORY (INHALATION) at 08:11

## 2020-11-25 NOTE — PLAN OF CARE
11/25/20 0716   Patient Assessment/Suction   Level of Consciousness (AVPU) alert   Respiratory Effort Normal;Unlabored   Expansion/Accessory Muscles/Retractions expansion symmetric;no retractions;no use of accessory muscles   All Lung Fields Breath Sounds diminished;equal bilaterally   PRE-TX-O2   O2 Device (Oxygen Therapy) room air   SpO2 97 %   Pulse Oximetry Type Continuous   $ Pulse Oximetry - Multiple Charge Pulse Oximetry - Multiple   Pulse 101   Resp 20   Inhaler   $ Inhaler Charges MDI (Metered Dose Inahler) Treatment;Given With Spacer   Daily Review of Necessity (Inhaler) completed   Respiratory Treatment Status (Inhaler) given   Treatment Route (Inhaler) mouthpiece;spacer/holding chamber   Patient Position (Inhaler) HOB elevated   Post Treatment Assessment (Inhaler) breath sounds unchanged   Signs of Intolerance (Inhaler) none   Breath Sounds Post-Respiratory Treatment   Post-treatment Heart Rate (beats/min) 105   Post-treatment Resp Rate (breaths/min) 20   Respiratory Evaluation   $ Care Plan Tech Time 15 min

## 2020-11-25 NOTE — NURSING
Pleasant patient was here on 1100 wing ten days ago for pneumonia was covid negative at the time went home feeling fine then started to spike fevers again, when he came to the floor he was trending higher and at that time gave PRN medication to bring fever down  Patient is on room air resting well he comes back in with a positive covid test all safety precautions have been followed through out the night no concerns or issues to report all questions answered a t bedside and all meds given as per the MAR

## 2020-11-25 NOTE — CONSULTS
Consult Note  Infectious Disease    Reason for Consult:  Fever, COVID    HPI: John Ball is a   45 y.o. male known to me from prior consultations and recent hospitalization for fever illness consistent with COVID infection but with negative rapid and routine PCR.  He was treated presumptively for COVID with remdesivir (received 3 days) and defervesced after this was initiated.  He was never hypoxemic, , D-dimer was normal, ferritin increased but still was within normal limits and he was discharged on oral doxycycline.  Yesterday afternoon his wife contacted me that he had a temperature of 101.8° having begun with low-grade temperature the evening before and escalating throughout the day yesterday.  I spoke with both he and his wife and his review of systems was negative except for fever and headache.  I advised that he returned to the hospital though he was very reluctant to do so.  My concern was that he might have escalation of cytokine during the 2nd phase of COVID illness.  In the emergency room his O2 saturation was 95%, white blood cells were minimally elevated, lymphocytes were decreased, ferritin had more than doubled to 392 and CRP had increased slightly.  Lactic acid was normal procalcitonin was normal, flu test was negative, COVID rapid PCR was now positive.  His chest x-ray showed a mild right lower lobe infiltrate.  He was blood cultured and admitted to Hospital Medicine on the isolation unit and Rocephin was begun at my request (spoke with the ER physician late last night).  This morning his O2 saturation is 93% while I am visiting with him, his lung exam is minimally abnormal, he is volume depleted with very concentrated urine but vital signs are stable.  He is agreeable to receiving COVID Convalescent plasma.      COVID 19: suspected 11/15--, proven 11/24  Procalcitonin: normal  Troponin:   BNP :  D-dimer:0.27--, 0.39  LDH:  Triglycerides:  Ferritin: 151-- 392  azithromycin :   Remdesivir  :,18,.19----  Convalescent plasma:   Blood type:a positive  IL-6:  Tocilizumab:  Recent Labs   Lab 20  0515 20  2103 20  0342   CRP 2.69* 2.83* 3.76*       Review of patient's allergies indicates:  No Known Allergies  History reviewed. No pertinent past medical history.  Past Surgical History:   Procedure Laterality Date    APPENDECTOMY      SINUS SURGERY      SPLENECTOMY, TOTAL      TONSILLECTOMY       Social History     Socioeconomic History    Marital status:      Spouse name: Not on file    Number of children: Not on file    Years of education: Not on file    Highest education level: Not on file   Occupational History    Not on file   Social Needs    Financial resource strain: Not on file    Food insecurity     Worry: Not on file     Inability: Not on file    Transportation needs     Medical: Not on file     Non-medical: Not on file   Tobacco Use    Smoking status: Former Smoker     Packs/day: 0.25     Years: 0.50     Pack years: 0.12     Quit date: 1997     Years since quittin.5    Smokeless tobacco: Never Used   Substance and Sexual Activity    Alcohol use: Yes     Comment: social     Drug use: Yes    Sexual activity: Yes     Partners: Female     Birth control/protection: None   Lifestyle    Physical activity     Days per week: Not on file     Minutes per session: Not on file    Stress: Not on file   Relationships    Social connections     Talks on phone: Not on file     Gets together: Not on file     Attends Judaism service: Not on file     Active member of club or organization: Not on file     Attends meetings of clubs or organizations: Not on file     Relationship status: Not on file   Other Topics Concern    Not on file   Social History Narrative    Not on file     History reviewed. No pertinent family history.    Pertinent medications noted:     Review of Systems:     chills, fever, sweats,    No change in vision, loss of vision or  diplopia  No sinus congestion, purulent nasal discharge, post nasal drip or facial pain  No pain in mouth or throat. No problems with teeth, gums.  No chest pain, palpitations, syncope  No cough, sputum production, shortness of breath, dyspnea on exertion, pleurisy, hemoptysis  No nausea, vomiting, diarrhea, , or focal abd pain,  No dysphagia, odynophagia  No dysuria,    No swelling of joints, redness of joints, injuries, or new focal pain  More than average headaches, no dizziness, vertigo, numbness, paresthesias, neuropathy, falls  No anxiety, depression, substance abuse, sleep disturbance  No diabetes,   No bleeding, lymphadenopathy, anemia, malignancy, unusual bruising  No new rashes, lesions, or wounds  No TB exposure  No recent/prior steroids   Antibiotic History: rocephin x 4 days and po doxy x 10 days prior to admit    EXAM & DIAGNOSTICS REVIEWED:   Vitals:     Temp:  [98.5 °F (36.9 °C)-102.5 °F (39.2 °C)]   Temp: 98.5 °F (36.9 °C) (11/25/20 0300)  Pulse: 101 (11/25/20 0716)  Resp: 20 (11/25/20 0716)  BP: (!) 147/81 (11/25/20 0136)  SpO2: 97 % (11/25/20 0716)  No intake or output data in the 24 hours ending 11/25/20 0752    General:  In NAD. Alert and attentive, cooperative, comfortable, flushed  Eyes:  Anicteric, PERRL, EOMI  ENT:  No ulcers, exudates, thrush, nares patent, dentition is excellent  Neck:  supple, no masses or adenopathy appreciated  Lungs: Faint right basilar crackles  Heart:  RRR, no gallop/murmur/rub noted, tachycardic  Abd:  Soft, NT, ND, normal BS, no masses or organomegaly appreciated.  :  Voids  no flank tenderness  Musc:  Joints without effusion, swelling, erythema, synovitis, muscle wasting.   Skin:  No rashes. No palmar or plantar lesions. No subungual petechiae  Wound:   Neuro:              Alert, attentive, speech fluent, face symmetric, moves all extremities, no focal weakness. Ambulatory  Psych:  Calm, cooperative  Lymphatic:     No cervical, supraclavicular,   nodes  Extrem: No edema, erythema, phlebitis, cellulitis, warm and well perfused  VAD:    peripheral   Isolation:  COVID    Lines/Tubes/Drains:    General Labs reviewed:  Recent Labs   Lab 11/19/20 0515 11/24/20 2102 11/25/20  0342   WBC 7.29 11.75 11.15   HGB 16.6 16.5 15.2   HCT 48.3 48.1 45.2    401* 420*       Recent Labs   Lab 11/19/20 0515 11/24/20 2103 11/25/20  0342   * 140 137   K 3.8 4.1 3.7   CL 99 105 102   CO2 25 25 24   BUN 11 12 13   CREATININE 0.9 0.9 0.8   CALCIUM 8.4* 9.4 8.5*   PROT 7.1 7.6 7.3   BILITOT 0.7 0.6 0.7   ALKPHOS 45* 49* 47*   ALT 25 25 24   AST 26 25 23           Micro:  Microbiology Results (last 7 days)     Procedure Component Value Units Date/Time    Blood culture #2 **CANNOT BE ORDERED STAT** [[id number]] Collected: 11/24/20 2139    Order Status: Completed Specimen: Blood from Peripheral, Antecubital, Left Updated: 11/25/20 0517     Blood Culture, Routine No Growth to date    Blood culture #1 **CANNOT BE ORDERED STAT** [[id number]] Collected: 11/24/20 2102    Order Status: Completed Specimen: Blood from Peripheral, Antecubital, Right Updated: 11/25/20 0517     Blood Culture, Routine No Growth to date        Imaging Reviewed:   CXR RLL infiltrate     Cardiology:    IMPRESSION & PLAN   1. COVID, proven now, with mild pneumonia RLL, slightly low sats   Fever, recurred 10 days after resolution of symptoms, worrisome for cytokine escalation  2. Asplenia  3. Volume depletion      Recommendations:  Remdesivir, again  COVID convalescent plasma (discussed and he consents)  Because his O2 sat was 93% at rest this am, will begin steroids  If blood cultures are negative tomorrow, can drop rocephin.  If inflammatory markers decline and oxygen requirements do not increase, I have no objection to discharge prior to completion of 5 day course of remdesivir  He may not really need isolation at this point, but it is still reasonable  One liter of NS    Medical Decision Making during  this encounter was  [_] Low Complexity  [_] Moderate Complexity  [ x ] High Complexity    The FDAs Convalescent Plasma Emergency Use Authorization Fact Sheet was provided to the patient and/or caregiver.  The patient and/or caregiver has been counseled that the FDA has authorized the emergency use of convalescent plasma which is not FDA approved. The significant known and potential risks and benefits of COVID-19 convalescent plasma and the extent to which such risks and benefits are unknown have been discussed with the patient and/or caregiver. The patient and/or caregiver has been given the option to accept or refuse and has verbally agreed to receive convalescent plasma. All questions and concerns were addressed.

## 2020-11-25 NOTE — PLAN OF CARE
"Spoke with pt's wife after calling the pt room phone twice and his cell phone and left VM to call this CM, verified his name, address, phone numbers on FS, and Insurance with BCBS PPO.  Pharmacy used is [facility] in [place], [place] on [address].  Was readmitted after being discharge from [facility] last week for same shell thing per pt's wife. Also pt wife is a nurse at other local facility.  His PCP is Dr carrasquillo for the time being per pt wife. She also mentioned that the pt will "just lye there and not call anyone for anything," and told her that th Cm will call him and check on him periodically and updated his nurse. She was appreciative.       11/25/20 4862   Discharge Assessment   Assessment Type Discharge Planning Assessment   Confirmed/corrected address and phone number on facesheet? Yes   Assessment information obtained from? Caregiver   Expected Length of Stay (days)   (Unknown due to Covid Isolation Precautions)   Communicated expected length of stay with patient/caregiver yes   Prior to hospitilization cognitive status: Alert/Oriented   Prior to hospitalization functional status: Independent   Current cognitive status: Alert/Oriented   Current Functional Status: Independent   Facility Arrived From: Home   Lives With spouse   Able to Return to Prior Arrangements yes   Is patient able to care for self after discharge? Yes   Who are your caregiver(s) and their phone number(s)? Spouse mrs Jewell Ball at cell [phone]   Patient's perception of discharge disposition home or selfcare   Readmission Within the Last 30 Days previous discharge plan unsuccessful   If yes, most recent facility name: [facility] one week ago   Patient currently being followed by outpatient case management? No   Patient currently receives any other outside agency services? No   Equipment Currently Used at Home none   Part D Coverage [employer]PP[employer]   Do you have any problems affording any of your prescribed medications? No   Is the patient taking " medications as prescribed? yes   Does the patient have transportation home? Yes   Transportation Anticipated family or friend will provide;car, drives self   Dialysis Name and Scheduled days NA   Does the patient receive services at the [facility]? No   Discharge Plan A Home with family   Discharge Plan B Home with family   DME Needed Upon Discharge  none   Patient/Family in Agreement with Plan yes   Readmission Questionnaire   At the time of your discharge, did someone talk to you about what your health problems were? Yes   At the time of discharge, did someone talk to you about what to watch out for regarding worsening of your health problem? Yes   At the time of discharge, did someone talk to you about what to do if you experienced worsening of your health problem? Yes   At the time of discharge, did someone talk to you about which medication to take when you left the hospital and which ones to stop taking? Yes   At the time of discharge, did someone talk to you about when and where to follow up with a doctor after you left the hospital? Yes   What do you believe caused you to be sick enough to be re-admitted? Pneumonia   How often do you need to have someone help you when you read instructions, pamphlets, or other written material from your doctor or pharmacy? Never   Do you have problems taking your medications as prescribed? No   Do you have any problems affording any of  your prescribed medications? No   Do you have problems obtaining/receiving your medications? No   Does the patient have transportation to healthcare appointments? Yes   Living Arrangements house   Does the patient have family/friends to help with healtcare needs after discharge? yes   Does your caregiver provide all the help you need? Yes   Are you currently feeling confused? No   Are you currently having problems thinking? No   Are you currently having memory problems? No   Have you felt down, depressed, or hopeless? 0   Have you felt  little interest or pleasure in doing things? 0   In the last 7 days, my sleep quality was: fair

## 2020-11-25 NOTE — ED PROVIDER NOTES
Encounter Date: 2020       History     Chief Complaint   Patient presents with    Fever     x2 weeks. Told to come back by infectious disease doctor bc he doesnt have a spleen and they can't explain fever.     Headache     This is a 45-year-old male with a past medical history splenectomy, presents emergency department with fever.  He was recently admitted to hospital but 2 weeks ago and had full workup.  Infectious disease doctors who wears his doctor and did multiple blood cultures x-rays urinalysis no obvious source of the patient's fever.  She was convinced that he had COVID and actually started him on remdesivir despite negative testing.  Patient was discharged home on doxycycline which is currently still taking.  He says that he has actually been feeling well but fever came back today.  He called her and let her know and she recommended he come to the emergency department.  Patient does state that he feels like he gets the headache before his fever spikes up but currently does not have a headache.  He is not have any symptoms at all currently he specifically denies neck pain or any rash.  He denies any sore throat or earache.  Denies a cough or any sputum production he denies any shortness of breath or chest pain he also denies abdominal pain vomiting or diarrhea or any urinary symptoms such as frequency or urgency or burning.        Review of patient's allergies indicates:  No Known Allergies  History reviewed. No pertinent past medical history.  Past Surgical History:   Procedure Laterality Date    APPENDECTOMY      SINUS SURGERY      SPLENECTOMY, TOTAL      TONSILLECTOMY       History reviewed. No pertinent family history.  Social History     Tobacco Use    Smoking status: Former Smoker     Packs/day: 0.25     Years: 0.50     Pack years: 0.12     Quit date: 1997     Years since quittin.5    Smokeless tobacco: Never Used   Substance Use Topics    Alcohol use: Yes     Comment: social      Drug use: Yes     Review of Systems   Constitutional: Positive for fever. Negative for chills.   HENT: Negative for sore throat.    Respiratory: Negative for shortness of breath.    Cardiovascular: Negative for chest pain.   Gastrointestinal: Negative for abdominal pain, nausea and vomiting.   Genitourinary: Negative for dysuria.   Musculoskeletal: Negative for back pain.   Skin: Negative for rash.   Neurological: Positive for headaches (none currently). Negative for weakness.   Hematological: Does not bruise/bleed easily.   All other systems reviewed and are negative.      Physical Exam     Initial Vitals [11/24/20 1848]   BP Pulse Resp Temp SpO2   (!) 151/76 (!) 111 16 99.4 °F (37.4 °C) 96 %      MAP       --         Physical Exam    Nursing note and vitals reviewed.  Constitutional: He appears well-developed and well-nourished. He is not diaphoretic. No distress.   HENT:   Head: Normocephalic and atraumatic.   Mouth/Throat: Oropharynx is clear and moist. No oropharyngeal exudate.   Minimal posterior erythema.  No exudate   Eyes: Conjunctivae and EOM are normal. Pupils are equal, round, and reactive to light.   Neck: Neck supple. No tracheal deviation present.   No meningismus   Cardiovascular: Regular rhythm, normal heart sounds and intact distal pulses.   No murmur heard.  Tachycardic   Pulmonary/Chest: Breath sounds normal. No stridor. No respiratory distress. He has no wheezes. He has no rhonchi. He has no rales.   Abdominal: Soft. Bowel sounds are normal. He exhibits no distension. There is no abdominal tenderness. There is no rebound and no guarding.   Musculoskeletal: Normal range of motion. No tenderness or edema.   Lymphadenopathy:     He has no cervical adenopathy.   Neurological: He is alert and oriented to person, place, and time. He has normal strength. No cranial nerve deficit or sensory deficit.   5/5 strength in upper and lower extremities bilaterally.  Speech is normal.  No focal neurological  deficits appreciated   Skin: Skin is warm and dry. Capillary refill takes less than 2 seconds. No rash noted. There is erythema (Erythematous macular blanchable rash on the upper torso ). No pallor.   Psychiatric: He has a normal mood and affect. His behavior is normal. Thought content normal.         ED Course   Procedures  Labs Reviewed   CBC W/ AUTO DIFFERENTIAL - Abnormal; Notable for the following components:       Result Value    MCH 32.7 (*)     RDW 15.1 (*)     Platelets 401 (*)     Immature Granulocytes 1.1 (*)     Immature Grans (Abs) 0.13 (*)     Mono # 1.4 (*)     All other components within normal limits   COMPREHENSIVE METABOLIC PANEL - Abnormal; Notable for the following components:    Alkaline Phosphatase 49 (*)     All other components within normal limits   SARS-COV-2 RNA AMPLIFICATION, QUAL - Abnormal; Notable for the following components:    SARS-CoV-2 RNA, Amplification, Qual Positive (*)     All other components within normal limits   C-REACTIVE PROTEIN - Abnormal; Notable for the following components:    CRP 2.83 (*)     All other components within normal limits   CULTURE, BLOOD   CULTURE, BLOOD   LACTIC ACID, PLASMA   INFLUENZA A AND B ANTIGEN    Narrative:     Specimen Source->Nasopharyngeal Swab   C-REACTIVE PROTEIN   CK   D DIMER, QUANTITATIVE   FERRITIN   TROPONIN I   CK   TROPONIN I   D DIMER, QUANTITATIVE   LACTATE DEHYDROGENASE   URINALYSIS, REFLEX TO URINE CULTURE   COVID-19 (SARS COV-2) IGG ANTIBODY   PROCALCITONIN   C-REACTIVE PROTEIN   SEDIMENTATION RATE   FERRITIN   POCT INFLUENZA A/B MOLECULAR          Imaging Results          X-Ray Chest PA And Lateral (Final result)  Result time 11/24/20 20:12:53    Final result by Hemalatha Hinojosa MD (11/24/20 20:12:53)                 Narrative:    PROCEDURE:   XR CHEST PA AND LATERAL  dated  11/24/2020 8:04 PM    CLINICAL HISTORY:   Male 45 years of age.   Fever and headache.    TECHNIQUE:  Frontal and lateral views of the chest were  obtained.    PREVIOUS STUDIES:  November 18, 2020. August 3, 2017.    FINDINGS:    PA rejection view shows new groundglass infiltrate in the medial right  lung base. There is no convincing correlate on the lateral projection  view. There is no pleural effusion or pneumothorax. Cardiac and  mediastinal contours are normal. Surgical clips are in the left upper  quadrant. Bones are unremarkable.    IMPRESSION:    New infiltrate in the right lung base, suspicious for pneumonia.    Electronically Signed by Hemalatha Hinojosa on 11/24/2020 8:29 PM                            Results for orders placed or performed during the hospital encounter of 11/24/20   CBC auto differential   Result Value Ref Range    WBC 11.75 3.90 - 12.70 K/uL    RBC 5.05 4.60 - 6.20 M/uL    Hemoglobin 16.5 14.0 - 18.0 g/dL    Hematocrit 48.1 40.0 - 54.0 %    MCV 95 82 - 98 fL    MCH 32.7 (H) 27.0 - 31.0 pg    MCHC 34.3 32.0 - 36.0 g/dL    RDW 15.1 (H) 11.5 - 14.5 %    Platelets 401 (H) 150 - 350 K/uL    MPV 10.1 9.2 - 12.9 fL    Immature Granulocytes 1.1 (H) 0.0 - 0.5 %    Gran # (ANC) 7.6 1.8 - 7.7 K/uL    Immature Grans (Abs) 0.13 (H) 0.00 - 0.04 K/uL    Lymph # 2.5 1.0 - 4.8 K/uL    Mono # 1.4 (H) 0.3 - 1.0 K/uL    Eos # 0.0 0.0 - 0.5 K/uL    Baso # 0.06 0.00 - 0.20 K/uL    nRBC 0 0 /100 WBC    Gran % 65.0 38.0 - 73.0 %    Lymph % 21.2 18.0 - 48.0 %    Mono % 12.2 4.0 - 15.0 %    Eosinophil % 0.0 0.0 - 8.0 %    Basophil % 0.5 0.0 - 1.9 %    Differential Method Automated    Comprehensive metabolic panel   Result Value Ref Range    Sodium 140 136 - 145 mmol/L    Potassium 4.1 3.5 - 5.1 mmol/L    Chloride 105 95 - 110 mmol/L    CO2 25 23 - 29 mmol/L    Glucose 100 70 - 110 mg/dL    BUN 12 6 - 20 mg/dL    Creatinine 0.9 0.5 - 1.4 mg/dL    Calcium 9.4 8.7 - 10.5 mg/dL    Total Protein 7.6 6.0 - 8.4 g/dL    Albumin 4.2 3.5 - 5.2 g/dL    Total Bilirubin 0.6 0.1 - 1.0 mg/dL    Alkaline Phosphatase 49 (L) 55 - 135 U/L    AST 25 10 - 40 U/L    ALT 25 10 - 44  U/L    Anion Gap 10 8 - 16 mmol/L    eGFR if African American >60.0 >60 mL/min/1.73 m^2    eGFR if non African American >60.0 >60 mL/min/1.73 m^2   Lactic acid, plasma   Result Value Ref Range    Lactate (Lactic Acid) 0.9 0.5 - 1.9 mmol/L   COVID-19 Rapid Screening   Result Value Ref Range    SARS-CoV-2 RNA, Amplification, Qual Positive (A) Negative   Influenza antigen Nasopharyngeal Swab   Result Value Ref Range    Influenza A, Molecular Negative Negative    Influenza B, Molecular Negative Negative    Flu A & B Source NP    C-Reactive Protein   Result Value Ref Range    CRP 2.83 (H) <0.76 mg/dL   CK   Result Value Ref Range    CPK 73 20 - 200 U/L   Troponin I   Result Value Ref Range    Troponin I <0.030 <=0.040 ng/mL   D-Dimer, Quantitative   Result Value Ref Range    D-Dimer 0.39 <0.50 ug/mL FEU   Lactate Dehydrogenase   Result Value Ref Range     110 - 260 U/L     X-Ray Chest PA And Lateral   Final Result             Medical Decision Making:   ED Management:  Patient presents emergency department fever which is persistent starting again today in the setting of having no spleen.  Throughout the emergency department stay he is found to have an infiltrate in his right lower lobe and is COVID positive.  I discussed in detail the case with Dr. Caruso who recommends treatment with Rocephin for the infiltrate in his right lower lobe.  Shoe re-evaluated in the morning and see if he is a candidate for plasma or not.  Patient is not hypoxemic does not need steroids at this point.  Will be admitted to the hospitalist for further care and evaluation of his symptoms.                   ED Course as of Nov 25 0020   Tue Nov 24, 2020 2132 EKG 9:20 p.m. normal sinus rhythm rate of 106.  Sinus tachycardia.  No ST elevation or depression.  No STEMI.  EKG interpreted independently by me.    [JR]   0588 Selena Caruso stated that she would see the patient in the morning she recommended Rocephin and she will decide on  whether not he needs plasma in the morning.    [JR]      ED Course User Index  [JR] Milton Recinos DO            Clinical Impression:       ICD-10-CM ICD-9-CM   1. COVID-19 virus infection  U07.1 079.89   2. Fever  R50.9 780.60   3. Pneumonia due to COVID-19 virus  U07.1 480.8    J12.89    4. Asplenia  Q89.01 759.0                          ED Disposition Condition    Admit                             Milton Recinos DO  11/25/20 0021

## 2020-11-25 NOTE — RESPIRATORY THERAPY
11/25/20 0047   PRE-TX-O2   O2 Device (Oxygen Therapy) room air   SpO2 (!) 92 %   Pulse (!) 124   Resp 13   Aerosol Therapy   $ Aerosol Therapy Charges Aerosol Treatment   Daily Review of Necessity (SVN) completed   Respiratory Treatment Status (SVN) given   Treatment Route (SVN) oxygen;mask   Patient Position (SVN) HOB elevated   Post Treatment Assessment (SVN) congestion decreased   Signs of Intolerance (SVN) none   Education   $ Education Bronchodilator   Respiratory Evaluation   $ Care Plan Tech Time 15 min

## 2020-11-25 NOTE — H&P
[facility] Medicine History & Physical Examination   Patient Name: John Ball  MRN: [id number]  Patient Class: IP- Inpatient   Admission Date: 11/24/2020  9:04 PM  Length of Stay: 0  Attending Physician:   Primary Care Provider: Liz Caruso MD  Face-to-Face encounter date: 11/24/2020  Code Status:Full Code  MPOA:  Chief Complaint: Fever (x2 weeks. Told to come back by infectious disease doctor bc he doesnt have a spleen and they can't explain fever. ) and Headache        Patient information was obtained from patient, past medical records and ER records.   HISTORY OF PRESENT ILLNESS:   John Ball is a 45 y.o. old  male who  has no past medical history on file.. The patient presented to [facility] on 11/24/2020 with a primary complaint of Fever (x2 weeks. Told to come back by infectious disease doctor bc he doesnt have a spleen and they can't explain fever. ) and Headache  .   45-year-old  male presents emergency room with fever and headache and body    .  This patient has a past medical history of a splenectomy secondary to.  He was recently admitted to this hospital 2 weeks ago and had a full infectious disease workup.  Dr. Caruso with ID strongly felt that the patient was infected with the COVID-19 virus and actually started him on Redesiver despite his negative Covid 19 testing at that time.      He was discharged home on doxycycline which she is currently taking.  He presented today with it persistent fever spikes and headaches.    The patient states about 2 weeks ago he attended a [employer] gathering with greater than 100 people.  He did not wear his mask nor were most of  the people at the event wearing there mask.  The patient believes he contracted the COVID-19 virus from this source    REVIEW OF SYSTEMS:   10 Point Review of System was performed and was found to be negative except for that mentioned already in the HPI and   Review of Systems (Negative  "unless checked off)  Review of Systems   Constitutional: Positive for fever and malaise/fatigue.   HENT: Negative.    Eyes: Negative.    Respiratory: Positive for cough and shortness of breath.    Cardiovascular: Negative.    Gastrointestinal: Negative.    Genitourinary: Negative.    Musculoskeletal: Positive for myalgias.   Skin: Negative.    Neurological: Positive for headaches.   Endo/Heme/Allergies: Negative.    Psychiatric/Behavioral: Negative.            PAST MEDICAL HISTORY:   History reviewed. No pertinent past medical history.    PAST SURGICAL HISTORY:     Past Surgical History:   Procedure Laterality Date    APPENDECTOMY      SINUS SURGERY      SPLENECTOMY, TOTAL      TONSILLECTOMY         ALLERGIES:   Patient has no known allergies.    FAMILY HISTORY:   History reviewed. No pertinent family history.    SOCIAL HISTORY:     Social History     Tobacco Use    Smoking status: Former Smoker     Packs/day: 0.25     Years: 0.50     Pack years: 0.12     Quit date: 1997     Years since quittin.5    Smokeless tobacco: Never Used   Substance Use Topics    Alcohol use: Yes     Comment: social         Social History     Substance and Sexual Activity   Sexual Activity Yes    Partners: Female    Birth control/protection: None        HOME MEDICATIONS:     Prior to Admission medications    Medication Sig Start Date End Date Taking? Authorizing Provider   atorvastatin (LIPITOR) 10 MG tablet Take 10 mg by mouth once daily.   Yes Historical Provider   doxycycline (VIBRAMYCIN) 100 MG Cap Take 1 capsule (100 mg total) by mouth every 12 (twelve) hours. for 14 days 11/19/20 12/3/20 Yes Bev Rutledge MD   pantoprazole (PROTONIX) 40 MG tablet Take 40 mg by mouth every morning.    Historical Provider         PHYSICAL EXAM:   BP (!) 143/75   Pulse 107   Temp 99.4 °F (37.4 °C) (Oral)   Resp (!) 22   Ht 5' 7" (1.702 m)   Wt 86.2 kg (190 lb)   SpO2 95%   BMI 29.76 kg/m²   Vitals Reviewed  General " appearance: Well-developed, well-nourished male in no apparent distress.  Skin: No Rash.   Neuro: Motor and sensory exams grossly intact. Good tone. Power in all 4 extremities 5/5.   HENT: Atraumatic head. Moist mucous membranes of oral cavity.  Eyes: Normal extraocular movements.   Neck: Supple. No evidence of lymphadenopathy. No thyroidomegaly.  Lungs: Clear to auscultation bilaterally. No wheezing present.   Heart: Regular rate and rhythm. S1 and S2 present with no murmurs/gallop/rub. No pedal edema. No JVD present.   Abdomen: Soft, non-distended, non-tender. No rebound tenderness/guarding. No masses or organomegaly. Bowel sounds are normal. Bladder is not palpable.   Extremities: No cyanosis, clubbing, or edema.  Psych/mental status: Alert and oriented. Cooperative. Responds appropriately to questions.   EMERGENCY DEPARTMENT LABS AND IMAGING:   Following labs were Reviewed   Recent Labs   Lab 11/24/20 2102 11/24/20 2103   WBC 11.75  --    HGB 16.5  --    HCT 48.1  --    *  --    CALCIUM  --  9.4   ALBUMIN  --  4.2   PROT  --  7.6   NA  --  140   K  --  4.1   CO2  --  25   CL  --  105   BUN  --  12   CREATININE  --  0.9   ALKPHOS  --  49*   ALT  --  25   AST  --  25   BILITOT  --  0.6         BMP:   Recent Labs   Lab 11/24/20 2103         K 4.1      CO2 25   BUN 12   CREATININE 0.9   CALCIUM 9.4   , CMP   Recent Labs   Lab 11/24/20 2103      K 4.1      CO2 25      BUN 12   CREATININE 0.9   CALCIUM 9.4   PROT 7.6   ALBUMIN 4.2   BILITOT 0.6   ALKPHOS 49*   AST 25   ALT 25   ANIONGAP 10   ESTGFRAFRICA >60.0   EGFRNONAA >60.0   , CBC   Recent Labs   Lab 11/24/20 2102   WBC 11.75   HGB 16.5   HCT 48.1   *   , INR No results found for: INR, PROTIME, Lipid Panel   Lab Results   Component Value Date    CHOL 144 07/23/2019    HDL 43 07/23/2019    LDLCALC 69.2 07/23/2019    TRIG 159 (H) 07/23/2019    CHOLHDL 29.9 07/23/2019   , Troponin No results for input(s):  TROPONINI in the last 168 hours., A1C: No results for input(s): HGBA1C in the last 4320 hours. and All labs within the past 24 hours have been reviewed  Microbiology Results (last 7 days)     Procedure Component Value Units Date/Time    Blood culture #2 **CANNOT BE ORDERED STAT** [[id number]] Collected: 11/24/20 2139    Order Status: Sent Specimen: Blood from Peripheral, Antecubital, Left Updated: 11/24/20 2150    Blood culture #1 **CANNOT BE ORDERED STAT** [[id number]] Collected: 11/24/20 2102    Order Status: Sent Specimen: Blood from Peripheral, Antecubital, Right Updated: 11/24/20 2116        X-Ray Chest PA And Lateral   Final Result        X-ray Chest Pa And Lateral    Result Date: 11/24/2020  PROCEDURE:   XR CHEST PA AND LATERAL  dated  11/24/2020 8:04 PM CLINICAL HISTORY:   Male 45 years of age.   Fever and headache. TECHNIQUE:  Frontal and lateral views of the chest were obtained. PREVIOUS STUDIES:  November 18, 2020. August 3, 2017. FINDINGS: PA rejection view shows new groundglass infiltrate in the medial right lung base. There is no convincing correlate on the lateral projection view. There is no pleural effusion or pneumothorax. Cardiac and mediastinal contours are normal. Surgical clips are in the left upper quadrant. Bones are unremarkable. IMPRESSION: New infiltrate in the right lung base, suspicious for pneumonia. Electronically Signed by Hemalatha Hinojosa on 11/24/2020 8:29 PM    X-ray Chest Ap Portable    Result Date: 11/18/2020  XR CHEST AP PORTABLE CLINICAL HISTORY: 45 years Male fever COMPARISON: November 15, 2020 FINDINGS: Cardiac silhouette size is within normal limits. No airspace consolidation. No pleural effusion or pneumothorax. No acute osseous abnormality. IMPRESSION: No acute pulmonary process. Electronically Signed by Raj JAMESON on 11/18/2020 3:31 PM    X-ray Chest Ap Portable    Result Date: 11/15/2020  PROCEDURE:   XR CHEST AP PORTABLE  dated  11/15/2020 9:42 PM CLINICAL  HISTORY:   Male 45 years of age.   pain TECHNIQUE: AP view of the chest obtained portably at 9:42 PM PREVIOUS STUDIES:  October 16, 2020 FINDINGS: The heart is not enlarged. Mediastinal, cardiac and hilar contours are normal. Lungs are clear. There is no pleural effusion or pneumothorax. Surgical clips project over the periphery of the left upper quadrant. Bones are unremarkable. IMPRESSION: No acute findings. Stable compared with the prior study. Normal size heart. Clear lungs. Electronically Signed by Hemalatha Hinojosa on 11/16/2020 7:25 AM    Xr Chest_stennis Performed    Result Date: 10/30/2020  EXAMINATION: XR CHEST_STENNIS PERFORMED TECHNIQUE: Dual subtraction radiography is performed of the chest. COMPARISON: Chest x-ray of February 16, 2019. FINDINGS: The mediastinal and cardiac size and contours are normal.  The diaphragms of pleura are clear.  There are no intrapulmonary masses or infiltrates.  There is no pneumothorax or pleural effusion.     Negative chest x-ray. Electronically signed by: Rodriguez Porter MD Date:    10/30/2020 Time:    09:40    ASSESSMENT & PLAN:   John Ball is a 45 y.o. male admitted for    1. Covid 19 infection  - Consult ID  - He received Redesiver on his last admit  - Possible plasma infusion  - defer steroids to id discretion  - Monitor inflammatory markers  - Vitamin C and Vitamin D supplements    Inflammatory marker profile  Ferritin- (392)  CRP - (2.83)  Lactate acid (0.9)  Procalcitonin (<0.05)  Influenza - Negative    2. Right Lower Lobe Pneumonia  - Rocephin Given per ID recommendation  - Albuterol/atrovent MDI  - supplemental O2 as needed    3. 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 Hyperlipidemia  -LFTs stable, continue statin      DVT Prophylaxis: will be placed on Lovenox for DVT prophylaxis and will be advised to be as mobile as possible and sit in a chair as tolerated.    ________________________________________________________________________________    Discharge Planning and Disposition: No mobility needs. Ambulating well. Good social support system. Patient will be discharged in   Face-to-Face encounter date: 11/24/2020  Encounter included review of the medical records, interviewing and examining the patient face-to-face, discussion with family and other health care providers including emergency medicine physician, admission orders, interpreting lab/test results and formulating a plan of care.   Medical Decision Making during this encounter was  [_] Low Complexity  [_] Moderate Complexity  [x] High Complexity  _________________________________________________________________________________    INPATIENT LIST OF MEDICATIONS     Current Facility-Administered Medications:     [START ON 11/25/2020] acetaminophen tablet 650 mg, 650 mg, Oral, Q8H PRN, Jessica Maynard NP    [START ON 11/25/2020] albuterol inhaler 2 puff, 2 puff, Inhalation, Q6H **AND** [START ON 11/25/2020] MDI Q6H, , , Q6H, Jessica Maynard NP    [START ON 11/25/2020] ascorbic acid (vitamin C) tablet 500 mg, 500 mg, Oral, BID, Jessica Maynard NP    [START ON 11/25/2020] atorvastatin tablet 10 mg, 10 mg, Oral, Daily, Jessica Maynard NP    cefTRIAXone (ROCEPHIN) 1 g/50 mL D5W IVPB, 1 g, Intravenous, ED 1 Time, Milton Recinos, DO    [START ON 11/25/2020] dextromethorphan-guaifenesin  mg/5 ml liquid 10 mL, 10 mL, Oral, Q4H PRN, Jessica Maynard NP    [START ON 11/25/2020] dextrose 50% injection 12.5 g, 12.5 g, Intravenous, PRN, Jessica Maynard NP    [START ON 11/25/2020] dextrose 50% injection 25 g, 25 g, Intravenous, PRN, Jessica Maynard NP    [START ON 11/25/2020] enoxaparin injection 40 mg, 40 mg, Subcutaneous, Q24H, Jessica Maynard NP    [START ON 11/25/2020] glucagon (human recombinant) injection 1 mg, 1 mg, Intramuscular, PRN, Jessica Manyard NP    [START ON 11/25/2020] glucose chewable tablet 16 g, 16 g,  Oral, PRN, Jessica Maynard NP    [START ON 11/25/2020] glucose chewable tablet 24 g, 24 g, Oral, PRN, Jessica Maynard NP    [START ON 11/25/2020] HYDROcodone-acetaminophen 5-325 mg per tablet 1 tablet, 1 tablet, Oral, Q6H PRN, Jessica Maynard NP    [START ON 11/25/2020] ipratropium inhaler 2 puff, 2 puff, Inhalation, Q6H **AND** [START ON 11/25/2020] MDI Q6H, , , Q6H, Jessica Maynard NP    [START ON 11/25/2020] melatonin tablet 6 mg, 6 mg, Oral, Nightly PRN, Jessica Maynard NP    [START ON 11/25/2020] multivitamin tablet, 1 tablet, Oral, Daily, Jessica Maynard NP    [START ON 11/25/2020] ondansetron injection 4 mg, 4 mg, Intravenous, Q8H PRN, Jessica Maynard NP    [START ON 11/25/2020] pantoprazole EC tablet 40 mg, 40 mg, Oral, QAM, Jessica Maynard NP    [START ON 11/25/2020] sodium chloride 0.9% flush 10 mL, 10 mL, Intravenous, PRN, Jessica Maynard NP    [START ON 11/25/2020] vitamin D 1000 units tablet 1,000 Units, 1,000 Units, Oral, Daily, Jessica Maynard NP    Current Outpatient Medications:     atorvastatin (LIPITOR) 10 MG tablet, Take 10 mg by mouth once daily., Disp: , Rfl:     doxycycline (VIBRAMYCIN) 100 MG Cap, Take 1 capsule (100 mg total) by mouth every 12 (twelve) hours. for 14 days, Disp: 28 capsule, Rfl: 0    pantoprazole (PROTONIX) 40 MG tablet, Take 40 mg by mouth every morning., Disp: , Rfl:       Scheduled Meds:   [START ON 11/25/2020] albuterol  2 puff Inhalation Q6H    [START ON 11/25/2020] ascorbic acid (vitamin C)  500 mg Oral BID    [START ON 11/25/2020] atorvastatin  10 mg Oral Daily    cefTRIAXone  1 g Intravenous ED 1 Time    [START ON 11/25/2020] enoxaparin  40 mg Subcutaneous Q24H    [START ON 11/25/2020] ipratropium  2 puff Inhalation Q6H    [START ON 11/25/2020] multivitamin  1 tablet Oral Daily    [START ON 11/25/2020] pantoprazole  40 mg Oral QAM    [START ON 11/25/2020] vitamin D  1,000 Units Oral Daily     Continuous Infusions:  PRN Meds:.[START ON 11/25/2020]  acetaminophen, [START ON 11/25/2020] dextromethorphan-guaifenesin  mg/5 ml, [START ON 11/25/2020] dextrose 50%, [START ON 11/25/2020] dextrose 50%, [START ON 11/25/2020] glucagon (human recombinant), [START ON 11/25/2020] glucose, [START ON 11/25/2020] glucose, [START ON 11/25/2020] HYDROcodone-acetaminophen, [START ON 11/25/2020] melatonin, [START ON 11/25/2020] ondansetron, [START ON 11/25/2020] sodium chloride 0.9%      Jessica Maynard  [facility] Hospitalist NP  11/24/2020

## 2020-11-26 LAB
25(OH)D3+25(OH)D2 SERPL-MCNC: 50 NG/ML (ref 30–96)
ALBUMIN SERPL BCP-MCNC: 3.7 G/DL (ref 3.5–5.2)
ALP SERPL-CCNC: 46 U/L (ref 55–135)
ALT SERPL W/O P-5'-P-CCNC: 21 U/L (ref 10–44)
ANION GAP SERPL CALC-SCNC: 10 MMOL/L (ref 8–16)
AST SERPL-CCNC: 21 U/L (ref 10–40)
BASOPHILS # BLD AUTO: 0.01 K/UL (ref 0–0.2)
BASOPHILS NFR BLD: 0.1 % (ref 0–1.9)
BILIRUB SERPL-MCNC: 0.5 MG/DL (ref 0.1–1)
BUN SERPL-MCNC: 12 MG/DL (ref 6–20)
CALCIUM SERPL-MCNC: 8.6 MG/DL (ref 8.7–10.5)
CHLORIDE SERPL-SCNC: 105 MMOL/L (ref 95–110)
CO2 SERPL-SCNC: 25 MMOL/L (ref 23–29)
CREAT SERPL-MCNC: 0.7 MG/DL (ref 0.5–1.4)
DIFFERENTIAL METHOD: ABNORMAL
EOSINOPHIL # BLD AUTO: 0 K/UL (ref 0–0.5)
EOSINOPHIL NFR BLD: 0 % (ref 0–8)
ERYTHROCYTE [DISTWIDTH] IN BLOOD BY AUTOMATED COUNT: 14.7 % (ref 11.5–14.5)
EST. GFR  (AFRICAN AMERICAN): >60 ML/MIN/1.73 M^2
EST. GFR  (NON AFRICAN AMERICAN): >60 ML/MIN/1.73 M^2
FERRITIN SERPL-MCNC: 374 NG/ML (ref 20–300)
GLUCOSE SERPL-MCNC: 105 MG/DL (ref 70–110)
HCT VFR BLD AUTO: 44.2 % (ref 40–54)
HGB BLD-MCNC: 14.8 G/DL (ref 14–18)
IMM GRANULOCYTES # BLD AUTO: 0.08 K/UL (ref 0–0.04)
IMM GRANULOCYTES NFR BLD AUTO: 0.9 % (ref 0–0.5)
LYMPHOCYTES # BLD AUTO: 2.2 K/UL (ref 1–4.8)
LYMPHOCYTES NFR BLD: 24.4 % (ref 18–48)
MAGNESIUM SERPL-MCNC: 2.1 MG/DL (ref 1.6–2.6)
MCH RBC QN AUTO: 31.2 PG (ref 27–31)
MCHC RBC AUTO-ENTMCNC: 33.5 G/DL (ref 32–36)
MCV RBC AUTO: 93 FL (ref 82–98)
MONOCYTES # BLD AUTO: 1.2 K/UL (ref 0.3–1)
MONOCYTES NFR BLD: 13.2 % (ref 4–15)
NEUTROPHILS # BLD AUTO: 5.6 K/UL (ref 1.8–7.7)
NEUTROPHILS NFR BLD: 61.4 % (ref 38–73)
NRBC BLD-RTO: 0 /100 WBC
PHOSPHATE SERPL-MCNC: 3.4 MG/DL (ref 2.7–4.5)
PLATELET # BLD AUTO: 432 K/UL (ref 150–350)
PMV BLD AUTO: 10.9 FL (ref 9.2–12.9)
POTASSIUM SERPL-SCNC: 3.9 MMOL/L (ref 3.5–5.1)
PROT SERPL-MCNC: 7.3 G/DL (ref 6–8.4)
RBC # BLD AUTO: 4.74 M/UL (ref 4.6–6.2)
SODIUM SERPL-SCNC: 140 MMOL/L (ref 136–145)
WBC # BLD AUTO: 9.08 K/UL (ref 3.9–12.7)

## 2020-11-26 PROCEDURE — 99900035 HC TECH TIME PER 15 MIN (STAT)

## 2020-11-26 PROCEDURE — 12000002 HC ACUTE/MED SURGE SEMI-PRIVATE ROOM

## 2020-11-26 PROCEDURE — 83735 ASSAY OF MAGNESIUM: CPT

## 2020-11-26 PROCEDURE — 82306 VITAMIN D 25 HYDROXY: CPT

## 2020-11-26 PROCEDURE — 99231 PR SUBSEQUENT HOSPITAL CARE,LEVL I: ICD-10-PCS | Mod: ,,, | Performed by: INTERNAL MEDICINE

## 2020-11-26 PROCEDURE — 36415 COLL VENOUS BLD VENIPUNCTURE: CPT

## 2020-11-26 PROCEDURE — 82728 ASSAY OF FERRITIN: CPT

## 2020-11-26 PROCEDURE — 94761 N-INVAS EAR/PLS OXIMETRY MLT: CPT

## 2020-11-26 PROCEDURE — 63600175 PHARM REV CODE 636 W HCPCS: Performed by: NURSE PRACTITIONER

## 2020-11-26 PROCEDURE — 25000003 PHARM REV CODE 250: Performed by: NURSE PRACTITIONER

## 2020-11-26 PROCEDURE — 25000003 PHARM REV CODE 250: Performed by: INTERNAL MEDICINE

## 2020-11-26 PROCEDURE — 85025 COMPLETE CBC W/AUTO DIFF WBC: CPT

## 2020-11-26 PROCEDURE — 84100 ASSAY OF PHOSPHORUS: CPT

## 2020-11-26 PROCEDURE — 99231 SBSQ HOSP IP/OBS SF/LOW 25: CPT | Mod: ,,, | Performed by: INTERNAL MEDICINE

## 2020-11-26 PROCEDURE — 25000242 PHARM REV CODE 250 ALT 637 W/ HCPCS: Performed by: INTERNAL MEDICINE

## 2020-11-26 PROCEDURE — 80053 COMPREHEN METABOLIC PANEL: CPT

## 2020-11-26 RX ORDER — ALBUTEROL SULFATE 90 UG/1
2 AEROSOL, METERED RESPIRATORY (INHALATION) EVERY 6 HOURS PRN
Status: DISCONTINUED | OUTPATIENT
Start: 2020-11-26 | End: 2020-11-27 | Stop reason: HOSPADM

## 2020-11-26 RX ADMIN — PANTOPRAZOLE SODIUM 40 MG: 40 TABLET, DELAYED RELEASE ORAL at 05:11

## 2020-11-26 RX ADMIN — ATORVASTATIN CALCIUM 10 MG: 10 TABLET, FILM COATED ORAL at 08:11

## 2020-11-26 RX ADMIN — DEXAMETHASONE 6 MG: 2 TABLET ORAL at 08:11

## 2020-11-26 RX ADMIN — Medication 1000 UNITS: at 08:11

## 2020-11-26 RX ADMIN — MELATONIN 6 MG: at 08:11

## 2020-11-26 RX ADMIN — THERA TABS 1 TABLET: TAB at 08:11

## 2020-11-26 RX ADMIN — GUAIFENESIN AND DEXTROMETHORPHAN 10 ML: 100; 10 SYRUP ORAL at 08:11

## 2020-11-26 RX ADMIN — ENOXAPARIN SODIUM 40 MG: 40 INJECTION SUBCUTANEOUS at 04:11

## 2020-11-26 RX ADMIN — OXYCODONE HYDROCHLORIDE AND ACETAMINOPHEN 500 MG: 500 TABLET ORAL at 08:11

## 2020-11-26 RX ADMIN — REMDESIVIR 100 MG: 100 INJECTION, POWDER, LYOPHILIZED, FOR SOLUTION INTRAVENOUS at 09:11

## 2020-11-26 NOTE — PLAN OF CARE
11/26/20 0800   Patient Assessment/Suction   Level of Consciousness (AVPU) alert   Respiratory Effort Normal;Unlabored   Expansion/Accessory Muscles/Retractions expansion symmetric;no retractions;no use of accessory muscles   All Lung Fields Breath Sounds diminished;equal bilaterally   PRE-TX-O2   O2 Device (Oxygen Therapy) room air   SpO2 95 %   Pulse Oximetry Type Intermittent   $ Pulse Oximetry - Multiple Charge Pulse Oximetry - Multiple   Pulse 80   Resp 16   Inhaler   $ Inhaler Charges PRN treatment not required   Respiratory Evaluation   $ Care Plan Tech Time 15 min

## 2020-11-26 NOTE — PROGRESS NOTES
[facility] Medicine  Progress Note   Patient Name: John Ball  MRN: [id number]  Patient Class: IP- Inpatient   Admission Date: 11/24/2020  9:04 PM  Length of Stay: 1  Attending Physician:  Marquis Zacarias MD  Primary Care Provider: Liz Caruso MD  Face-to-Face encounter date: 11/25/2020  Code Status:Full Code  MPOA:  Chief Complaint: Fever (x2 weeks. Told to come back by infectious disease doctor bc he doesnt have a spleen and they can't explain fever. ) and Headache     HISTORY OF PRESENT ILLNESS:   John Ball is a 45 y.o. old  male who  has no past medical history on file.. The patient presented to [facility] on 11/24/2020 with a primary complaint of Fever (x2 weeks. Told to come back by infectious disease doctor bc he doesnt have a spleen and they can't explain fever. ) and Headache  .   45-year-old  male presents emergency room with fever and headache and body  This patient has a past medical history of a splenectomy secondary to.  He was recently admitted to this hospital 2 weeks ago and had a full infectious disease workup.  Dr. Caruso with ID strongly felt that the patient was infected with the COVID-19 virus and actually started him on Redesiver despite his negative Covid 19 testing at that time.    He was discharged home on doxycycline which she is currently taking.  He presented today with it persistent fever spikes and headaches.  The patient states about 2 weeks ago he attended a [employer] gathering with greater than 100 people.  He did not wear his mask nor were most of  the people at the event wearing there mask.  The patient believes he contracted the COVID-19 virus from this source    Interval history:     Today the patient reports shortness of breath is much improved overnight with medical treatment.  Currently shortness of breath is mild intensity at rest, constant timing, worsens somewhat with exertion.  He has some intermittent cough that  is improving.  No fever or chills.  No hemoptysis.  No loss of taste or smell.  Minimal generalized weakness.  No headache or syncope.  No diarrhea.    The patient was on room air this morning and developed hypoxemia down to 90% and was subsequently started on 2 L nasal cannula.  Throughout the day the patient has been weaned from nasal cannula and is now oxygenating appropriately on room air    PHYSICAL EXAM:     Vitals Reviewed  General appearance:  Comfortable appearing, nontoxic, no apparent distress  Skin:  Dry and warm with no jaundice  Neuro:  Nonfocal motor exam, fluent speech, alert and oriented, follows commands appropriately  HENT: Atraumatic head. Moist mucous membranes of oral cavity.  No thrush  Eyes: Normal extraocular movements.  Anicteric sclerae, no conjunctival discharge  Lungs: Clear to auscultation bilaterally.  Comfortable work of breathing  Heart: Regular rate and rhythm.  2+ radial pulses. No pedal edema.   Abdomen: Soft, non-distended, non-tender. No rebound tenderness/guarding.  Psych:  Mood calm, affect normal, insight good    Labs and imaging data   Following labs were Reviewed   Recent Labs   Lab 11/25/20 0342   WBC 11.15   HGB 15.2   HCT 45.2   *   CALCIUM 8.5*   ALBUMIN 3.7   PROT 7.3      K 3.7   CO2 24      BUN 13   CREATININE 0.8   ALKPHOS 47*   ALT 24   AST 23   BILITOT 0.7         BMP:   Recent Labs   Lab 11/24/20 2103 11/25/20  0342    105    137   K 4.1 3.7    102   CO2 25 24   BUN 12 13   CREATININE 0.9 0.8   CALCIUM 9.4 8.5*   MG  --  2.0   , CMP   Recent Labs   Lab 11/24/20 2103 11/25/20 0342    137   K 4.1 3.7    102   CO2 25 24    105   BUN 12 13   CREATININE 0.9 0.8   CALCIUM 9.4 8.5*   PROT 7.6 7.3   ALBUMIN 4.2 3.7   BILITOT 0.6 0.7   ALKPHOS 49* 47*   AST 25 23   ALT 25 24   ANIONGAP 10 11   ESTGFRAFRICA >60.0 >60.0   EGFRNONAA >60.0 >60.0   , CBC   Recent Labs   Lab 11/24/20 2102 11/25/20 0342   WBC 11.75  11.15   HGB 16.5 15.2   HCT 48.1 45.2   * 420*   , INR No results found for: INR, PROTIME, Lipid Panel   Lab Results   Component Value Date    CHOL 144 07/23/2019    HDL 43 07/23/2019    LDLCALC 69.2 07/23/2019    TRIG 159 (H) 07/23/2019    CHOLHDL 29.9 07/23/2019   , Troponin   Recent Labs   Lab 11/24/20 2103   TROPONINI <0.030   , A1C: No results for input(s): HGBA1C in the last 4320 hours. and All labs within the past 24 hours have been reviewed  Microbiology Results (last 7 days)     Procedure Component Value Units Date/Time    Blood culture #2 **CANNOT BE ORDERED STAT** [[id number]] Collected: 11/24/20 2139    Order Status: Completed Specimen: Blood from Peripheral, Antecubital, Left Updated: 11/25/20 0517     Blood Culture, Routine No Growth to date    Blood culture #1 **CANNOT BE ORDERED STAT** [[id number]] Collected: 11/24/20 2102    Order Status: Completed Specimen: Blood from Peripheral, Antecubital, Right Updated: 11/25/20 0517     Blood Culture, Routine No Growth to date        X-Ray Chest PA And Lateral   Final Result        X-ray Chest Pa And Lateral    Result Date: 11/24/2020  PROCEDURE:   XR CHEST PA AND LATERAL  dated  11/24/2020 8:04 PM CLINICAL HISTORY:   Male 45 years of age.   Fever and headache. TECHNIQUE:  Frontal and lateral views of the chest were obtained. PREVIOUS STUDIES:  November 18, 2020. August 3, 2017. FINDINGS: PA rejection view shows new groundglass infiltrate in the medial right lung base. There is no convincing correlate on the lateral projection view. There is no pleural effusion or pneumothorax. Cardiac and mediastinal contours are normal. Surgical clips are in the left upper quadrant. Bones are unremarkable. IMPRESSION: New infiltrate in the right lung base, suspicious for pneumonia. Electronically Signed by Hemalatha Hinojosa on 11/24/2020 8:29 PM    X-ray Chest Ap Portable    Result Date: 11/18/2020  XR CHEST AP PORTABLE CLINICAL HISTORY: 45 years Male fever COMPARISON:  November 15, 2020 FINDINGS: Cardiac silhouette size is within normal limits. No airspace consolidation. No pleural effusion or pneumothorax. No acute osseous abnormality. IMPRESSION: No acute pulmonary process. Electronically Signed by Raj JAMESON on 11/18/2020 3:31 PM    X-ray Chest Ap Portable    Result Date: 11/15/2020  PROCEDURE:   XR CHEST AP PORTABLE  dated  11/15/2020 9:42 PM CLINICAL HISTORY:   Male 45 years of age.   pain TECHNIQUE: AP view of the chest obtained portably at 9:42 PM PREVIOUS STUDIES:  October 16, 2020 FINDINGS: The heart is not enlarged. Mediastinal, cardiac and hilar contours are normal. Lungs are clear. There is no pleural effusion or pneumothorax. Surgical clips project over the periphery of the left upper quadrant. Bones are unremarkable. IMPRESSION: No acute findings. Stable compared with the prior study. Normal size heart. Clear lungs. Electronically Signed by Hemalatha Hinojosa on 11/16/2020 7:25 AM    Xr Chest_stennis Performed    Result Date: 10/30/2020  EXAMINATION: XR CHEST_STENNIS PERFORMED TECHNIQUE: Dual subtraction radiography is performed of the chest. COMPARISON: Chest x-ray of February 16, 2019. FINDINGS: The mediastinal and cardiac size and contours are normal.  The diaphragms of pleura are clear.  There are no intrapulmonary masses or infiltrates.  There is no pneumothorax or pleural effusion.     Negative chest x-ray. Electronically signed by: Rodriguez Porter MD Date:    10/30/2020 Time:    09:40    ASSESSMENT & PLAN:   John Ball is a 45 y.o. male admitted for    1. Covid 19 infection with RLL pneumonia  Mild hypoxemia, improved  - Consult ID  - He received Redesiver on his last admit  - Possible plasma infusion  - defer steroids to id discretion  - Monitor inflammatory markers  - Vitamin C and Vitamin D supplements    Inflammatory marker profile  Ferritin- (392)  CRP - (2.83)  Lactate acid (0.9)  Procalcitonin (<0.05)  Influenza - Negative    2. Right Lower  Lobe Pneumonia  - Rocephin Given per ID recommendation  - Albuterol/atrovent MDI  - supplemental O2 as needed    3. 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 Hyperlipidemia  -LFTs stable, continue statin    Plan update today:  Continue care on Med surg with appropriate COVID isolation  Appreciate consultants including Infectious Disease  Patient had mild hypoxemia today that improved with nasal cannula oxygen and subsequently has improved with medical treatment, now on room air  Continue MDI bronchodilator, mucolytic, and antitussive as needed  Continue dexamethasone 6 mg daily times 10 days  Continue IV remdesivir x5 day  Receiving convalescent plasma transfusion  Vitamin-C and vitamin-D supplements  Continue empiric IV Rocephin and deescalate as able  Gentle IV fluids today  Serial labs and inflammatory markers  Mobilize as able.  Pronating as able.  Continue home medications for chronic issues as able  High risk secondary to acute illness with risk to life from COVID-19 pneumonia hypoxemia  Dispo:  This patient looks good today and may improve rapidly - possible discharge after 2-3 days of remdesivir if improved    DVT Prophylaxis: will be placed on Lovenox for DVT prophylaxis and will be advised to be as mobile as possible and sit in a chair as tolerated.   ________________________________________________________________________________    Discharge Planning and Disposition: No mobility needs. Ambulating well. Good social support system. Patient will be discharged in   Face-to-Face encounter date: 11/25/2020  Encounter included review of the medical records, interviewing and examining the patient face-to-face, discussion with family and other health care providers including emergency medicine physician, admission orders, interpreting lab/test results and formulating a plan of care.   Medical Decision Making during this encounter was  [_] Low Complexity  [_] Moderate Complexity  [x] High  Complexity      Marquis Zacarias  [facility] Hospitalist NP  11/25/2020

## 2020-11-26 NOTE — PROGRESS NOTES
Consult Note  Infectious Disease    Reason for Consult:  Fever, COVID    HPI: John Ball is a   45 y.o. male known to me from prior consultations and recent hospitalization for fever illness consistent with COVID infection but with negative rapid and routine PCR.  He was treated presumptively for COVID with remdesivir (received 3 days) and defervesced after this was initiated.  He was never hypoxemic, , D-dimer was normal, ferritin increased but still was within normal limits and he was discharged on oral doxycycline.  Yesterday afternoon his wife contacted me that he had a temperature of 101.8° having begun with low-grade temperature the evening before and escalating throughout the day yesterday.  I spoke with both he and his wife and his review of systems was negative except for fever and headache.  I advised that he returned to the hospital though he was very reluctant to do so.  My concern was that he might have escalation of cytokine during the 2nd phase of COVID illness.  In the emergency room his O2 saturation was 95%, white blood cells were minimally elevated, lymphocytes were decreased, ferritin had more than doubled to 392 and CRP had increased slightly.  Lactic acid was normal procalcitonin was normal, flu test was negative, COVID rapid PCR was now positive.  His chest x-ray showed a mild right lower lobe infiltrate.  He was blood cultured and admitted to Hospital Medicine on the isolation unit and Rocephin was begun at my request (spoke with the ER physician late last night).  This morning his O2 saturation is 93% while I am visiting with him, his lung exam is minimally abnormal, he is volume depleted with very concentrated urine but vital signs are stable.  He is agreeable to receiving COVID Convalescent plasma.      11/26/2020.  Patient feels much better.  He has minimal cough is any.  No phlegm.  Energy level is improved.  Oxygenating well on room air.  95-96% when laying.  He has been walking up  and about the room but does not know exactly was the lowest pulse ox.    COVID 19: suspected 11/15--, proven 11/24  Procalcitonin: normal  Troponin:   BNP :  D-dimer:0.27--, 0.39  LDH:  Triglycerides:  Ferritin: 151-- 392  azithromycin :   Remdesivir :11/17,18,.19---11/25-  Convalescent plasma: 11/25  Blood type:a positive  IL-6:  Tocilizumab:  Recent Labs   Lab 11/24/20  2103 11/25/20  0342   CRP 2.83* 3.76*     Antibiotics (From admission, onward)    None        Antifungals (From admission, onward)    None        Antivirals (From admission, onward)        Stop Route Frequency     remdesivir (EUA) 100 mg      11/30 0859 IV Every 24 hours (non-standard times)          EXAM & DIAGNOSTICS REVIEWED:   Vitals:     Temp:  [98.1 °F (36.7 °C)-101.1 °F (38.4 °C)]   Temp: 98.1 °F (36.7 °C) (11/26/20 0700)  Pulse: 80 (11/26/20 0800)  Resp: 16 (11/26/20 0800)  BP: 124/84 (11/26/20 0700)  SpO2: 95 % (11/26/20 0800)    Intake/Output Summary (Last 24 hours) at 11/26/2020 1048  Last data filed at 11/25/2020 1430  Gross per 24 hour   Intake 368 ml   Output --   Net 368 ml       General:  In NAD. Alert and attentive, cooperative, comfortable, flushed.  Sweats.  He tells me this happens every time he is anxious.  Eyes:  Anicteric, PERRL, EOMI  ENT:  No ulcers, exudates, thrush, nares patent, dentition is excellent  Neck:  supple, no masses or adenopathy appreciated  Lungs: Faint right basilar crackles  Heart:  RRR, no gallop/murmur/rub noted, tachycardic  Abd:  Soft, NT, ND, normal BS, no masses or organomegaly appreciated.  :  Voids  no flank tenderness  Musc:  Joints without effusion, swelling, erythema, synovitis, muscle wasting.   Skin:  No rashes. No palmar or plantar lesions. No subungual petechiae  Wound:   Neuro: Alert, attentive, speech fluent, face symmetric, moves all extremities, no focal weakness. Ambulatory  Psych:  Calm, cooperative  Lymphatic:     No cervical, supraclavicular,  nodes  Extrem: No edema, erythema,  phlebitis, cellulitis, warm and well perfused  VAD:   peripheral   Isolation:  COVID    Lines/Tubes/Drains:    General Labs reviewed:  Recent Labs   Lab 11/24/20 2102 11/25/20 0342 11/26/20  0314   WBC 11.75 11.15 9.08   HGB 16.5 15.2 14.8   HCT 48.1 45.2 44.2   * 420* 432*       Recent Labs   Lab 11/24/20 2103 11/25/20 0342 11/26/20  0314    137 140   K 4.1 3.7 3.9    102 105   CO2 25 24 25   BUN 12 13 12   CREATININE 0.9 0.8 0.7   CALCIUM 9.4 8.5* 8.6*   PROT 7.6 7.3 7.3   BILITOT 0.6 0.7 0.5   ALKPHOS 49* 47* 46*   ALT 25 24 21   AST 25 23 21           Micro:  Microbiology Results (last 7 days)     Procedure Component Value Units Date/Time    Blood culture #1 **CANNOT BE ORDERED STAT** [[id number]] Collected: 11/24/20 2102    Order Status: Completed Specimen: Blood from Peripheral, Antecubital, Right Updated: 11/25/20 2232     Blood Culture, Routine No Growth to date      No Growth to date    Blood culture #2 **CANNOT BE ORDERED STAT** [[id number]] Collected: 11/24/20 2139    Order Status: Completed Specimen: Blood from Peripheral, Antecubital, Left Updated: 11/25/20 2232     Blood Culture, Routine No Growth to date      No Growth to date        Imaging Reviewed:   CXR RLL infiltrate     Cardiology:    IMPRESSION & PLAN   1. COVID, proven now, with mild pneumonia RLL, slightly low sats   Fever, recurred 10 days after resolution of symptoms, worrisome for cytokine escalation  2. Asplenia, thrombocytosis.  3. Volume depletion      Recommendations:  Remdesivir,2/5--I think he should complete 5 days, but he is resisting it.  At least he should get 3 doses   S/p COVID convalescent plasma  Steroids.  Vitamin-C.  Vitamin-D.  I encouraged him to breathing in deeply.  Follow IgG antibody for COVID-19.  He may not really need isolation at this point, but it is still reasonable

## 2020-11-26 NOTE — PROGRESS NOTES
[facility], [facility] Medicine  Progress Note   Patient Name: John Ball  MRN: [id number]  Patient Class: IP- Inpatient   Admission Date: 11/24/2020  9:04 PM  Length of Stay: 2  Attending Physician:  Marquis Zacarias MD  Primary Care Provider: Liz Caruso MD  Face-to-Face encounter date: 11/26/2020  Code Status:Full Code  MPOA:  Chief Complaint: Fever (x2 weeks. Told to come back by infectious disease doctor bc he doesnt have a spleen and they can't explain fever. ) and Headache     HISTORY OF PRESENT ILLNESS:   John Ball is a 45 y.o. old  male who  has no past medical history on file.. The patient presented to [facility] on 11/24/2020 with a primary complaint of Fever (x2 weeks. Told to come back by infectious disease doctor bc he doesnt have a spleen and they can't explain fever. ) and Headache  45-year-old  male presents emergency room with fever and headache and body  This patient has a past medical history of a splenectomy secondary to.  He was recently admitted to this hospital 2 weeks ago and had a full infectious disease workup.  Dr. Caruso with ID strongly felt that the patient was infected with the COVID-19 virus and actually started him on Redesiver despite his negative Covid 19 testing at that time.    He was discharged home on doxycycline which she is currently taking.  He presented today with it persistent fever spikes and headaches.  The patient states about 2 weeks ago he attended a [employer] gathering with greater than 100 people.  He did not wear his mask nor were most of  the people at the event wearing there mask.  The patient believes he contracted the COVID-19 virus from this source    Interval history:     Today the patient reports shortness of breath continues to improve overnight with medical treatment.  Currently the patient reports shortness of breath is resolved at rest.  He reports ambulating around the room without dyspnea on exertion.  He  is tolerating room air.  He has some intermittent cough that is improving.  No fever or chills.  No hemoptysis.  No loss of taste or smell.  Minimal generalized weakness. No diarrhea.    PHYSICAL EXAM:     Vitals Reviewed  General appearance:  Comfortable appearing, nontoxic, no apparent distress  Skin:  Dry and warm with no jaundice  Neuro:  Nonfocal motor exam, fluent speech, alert and oriented, follows commands appropriately  HENT: Atraumatic head. Moist mucous membranes of oral cavity.  No thrush  Eyes: Normal extraocular movements.  Anicteric sclerae, no conjunctival discharge  Lungs: Clear to auscultation bilaterally.  Comfortable work of breathing  Heart: Regular rate and rhythm.  2+ radial pulses. No pedal edema.   Abdomen: Soft, non-distended, non-tender. No rebound tenderness/guarding.  Psych:  Mood calm, affect normal, insight good    Labs and imaging data   Following labs were Reviewed   Recent Labs   Lab 11/26/20 0314   WBC 9.08   HGB 14.8   HCT 44.2   *   CALCIUM 8.6*   ALBUMIN 3.7   PROT 7.3      K 3.9   CO2 25      BUN 12   CREATININE 0.7   ALKPHOS 46*   ALT 21   AST 21   BILITOT 0.5         BMP:   Recent Labs   Lab 11/24/20 2103 11/25/20  0342 11/26/20  0314    105 105    137 140   K 4.1 3.7 3.9    102 105   CO2 25 24 25   BUN 12 13 12   CREATININE 0.9 0.8 0.7   CALCIUM 9.4 8.5* 8.6*   MG  --  2.0 2.1   , CMP   Recent Labs   Lab 11/24/20 2103 11/25/20 0342 11/26/20  0314    137 140   K 4.1 3.7 3.9    102 105   CO2 25 24 25    105 105   BUN 12 13 12   CREATININE 0.9 0.8 0.7   CALCIUM 9.4 8.5* 8.6*   PROT 7.6 7.3 7.3   ALBUMIN 4.2 3.7 3.7   BILITOT 0.6 0.7 0.5   ALKPHOS 49* 47* 46*   AST 25 23 21   ALT 25 24 21   ANIONGAP 10 11 10   ESTGFRAFRICA >60.0 >60.0 >60.0   EGFRNONAA >60.0 >60.0 >60.0   , CBC   Recent Labs   Lab 11/24/20  2102 11/25/20  0342 11/26/20  0314   WBC 11.75 11.15 9.08   HGB 16.5 15.2 14.8   HCT 48.1 45.2 44.2   *  420* 432*   , INR No results found for: INR, PROTIME, Lipid Panel   Lab Results   Component Value Date    CHOL 144 07/23/2019    HDL 43 07/23/2019    LDLCALC 69.2 07/23/2019    TRIG 159 (H) 07/23/2019    CHOLHDL 29.9 07/23/2019   , Troponin   Recent Labs   Lab 11/24/20 2103   TROPONINI <0.030   , A1C: No results for input(s): HGBA1C in the last 4320 hours. and All labs within the past 24 hours have been reviewed  Microbiology Results (last 7 days)     Procedure Component Value Units Date/Time    Blood culture #1 **CANNOT BE ORDERED STAT** [[id number]] Collected: 11/24/20 2102    Order Status: Completed Specimen: Blood from Peripheral, Antecubital, Right Updated: 11/25/20 2232     Blood Culture, Routine No Growth to date      No Growth to date    Blood culture #2 **CANNOT BE ORDERED STAT** [[id number]] Collected: 11/24/20 2139    Order Status: Completed Specimen: Blood from Peripheral, Antecubital, Left Updated: 11/25/20 2232     Blood Culture, Routine No Growth to date      No Growth to date        X-Ray Chest PA And Lateral   Final Result        X-ray Chest Pa And Lateral    Result Date: 11/24/2020  PROCEDURE:   XR CHEST PA AND LATERAL  dated  11/24/2020 8:04 PM CLINICAL HISTORY:   Male 45 years of age.   Fever and headache. TECHNIQUE:  Frontal and lateral views of the chest were obtained. PREVIOUS STUDIES:  November 18, 2020. August 3, 2017. FINDINGS: PA rejection view shows new groundglass infiltrate in the medial right lung base. There is no convincing correlate on the lateral projection view. There is no pleural effusion or pneumothorax. Cardiac and mediastinal contours are normal. Surgical clips are in the left upper quadrant. Bones are unremarkable. IMPRESSION: New infiltrate in the right lung base, suspicious for pneumonia. Electronically Signed by Hemalatha Hinojosa on 11/24/2020 8:29 PM    X-ray Chest Ap Portable    Result Date: 11/18/2020  XR CHEST AP PORTABLE CLINICAL HISTORY: 45 years Male fever COMPARISON:  November 15, 2020 FINDINGS: Cardiac silhouette size is within normal limits. No airspace consolidation. No pleural effusion or pneumothorax. No acute osseous abnormality. IMPRESSION: No acute pulmonary process. Electronically Signed by Raj JAMESON on 11/18/2020 3:31 PM    X-ray Chest Ap Portable    Result Date: 11/15/2020  PROCEDURE:   XR CHEST AP PORTABLE  dated  11/15/2020 9:42 PM CLINICAL HISTORY:   Male 45 years of age.   pain TECHNIQUE: AP view of the chest obtained portably at 9:42 PM PREVIOUS STUDIES:  October 16, 2020 FINDINGS: The heart is not enlarged. Mediastinal, cardiac and hilar contours are normal. Lungs are clear. There is no pleural effusion or pneumothorax. Surgical clips project over the periphery of the left upper quadrant. Bones are unremarkable. IMPRESSION: No acute findings. Stable compared with the prior study. Normal size heart. Clear lungs. Electronically Signed by Hemalatha Hinojosa on 11/16/2020 7:25 AM    Xr Chest_stennis Performed    Result Date: 10/30/2020  EXAMINATION: XR CHEST_STENNIS PERFORMED TECHNIQUE: Dual subtraction radiography is performed of the chest. COMPARISON: Chest x-ray of February 16, 2019. FINDINGS: The mediastinal and cardiac size and contours are normal.  The diaphragms of pleura are clear.  There are no intrapulmonary masses or infiltrates.  There is no pneumothorax or pleural effusion.     Negative chest x-ray. Electronically signed by: Rodriguez Porter MD Date:    10/30/2020 Time:    09:40    ASSESSMENT & PLAN:   John Ball is a 45 y.o. male admitted for    1. Covid 19 infection with RLL pneumonia  Mild hypoxemia, improved  - Consult ID  - He received Redesiver on his last admit  - Possible plasma infusion  - defer steroids to id discretion  - Monitor inflammatory markers  - Vitamin C and Vitamin D supplements    Inflammatory marker profile  Ferritin- (392)  CRP - (2.83)  Lactate acid (0.9)  Procalcitonin (<0.05)  Influenza - Negative    2. Right Lower  Lobe Pneumonia  - Rocephin Given per ID recommendation  - Albuterol/atrovent MDI  - supplemental O2 as needed    3. 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 Hyperlipidemia  -LFTs stable, continue statin    Plan update today:  This patient is doing so well clinically that he may be able to go home tomorrow after completion of 3 days of IV remdesivir  Continue care on Med surg with appropriate COVID isolation  Appreciate consultants including Infectious Disease  Continue MDI bronchodilator, mucolytic, and antitussive as needed  Continue dexamethasone 6 mg daily times 10 days  Continue IV remdesivir x3-5 days  Received convalescent plasma transfusion  Vitamin-C and vitamin-D supplements  Discontinue IV Rocephin  Gentle IV fluids today  Serial labs and inflammatory markers  Mobilize as able.  Pronating as able.  Continue home medications for chronic issues as able  High risk secondary to acute illness with risk to life from COVID-19 pneumonia hypoxemia  Dispo:  This patient looks good today and may improve rapidly - possible discharge tomorrow after 3 days of remdesivir if improved    DVT Prophylaxis: will be placed on Lovenox for DVT prophylaxis and will be advised to be as mobile as possible and sit in a chair as tolerated.   ________________________________________________________________________________    Discharge Planning and Disposition: No mobility needs. Ambulating well. Good social support system. Patient will be discharged in   Face-to-Face encounter date: 11/26/2020  Encounter included review of the medical records, interviewing and examining the patient face-to-face, discussion with family and other health care providers including emergency medicine physician, admission orders, interpreting lab/test results and formulating a plan of care.   Medical Decision Making during this encounter was  [_] Low Complexity  [_] Moderate Complexity  [x] High Complexity      Marquis Zacarias MD  [facility] Hospitalist    11/26/2020

## 2020-11-27 VITALS
SYSTOLIC BLOOD PRESSURE: 138 MMHG | OXYGEN SATURATION: 96 % | HEART RATE: 71 BPM | WEIGHT: 191.38 LBS | DIASTOLIC BLOOD PRESSURE: 91 MMHG | RESPIRATION RATE: 19 BRPM | HEIGHT: 67 IN | TEMPERATURE: 99 F | BODY MASS INDEX: 30.04 KG/M2

## 2020-11-27 DIAGNOSIS — U07.1 COVID-19 VIRUS DETECTED: ICD-10-CM

## 2020-11-27 LAB
ALBUMIN SERPL BCP-MCNC: 3.9 G/DL (ref 3.5–5.2)
ALP SERPL-CCNC: 46 U/L (ref 55–135)
ALT SERPL W/O P-5'-P-CCNC: 22 U/L (ref 10–44)
ANION GAP SERPL CALC-SCNC: 10 MMOL/L (ref 8–16)
AST SERPL-CCNC: 21 U/L (ref 10–40)
BASOPHILS # BLD AUTO: 0.02 K/UL (ref 0–0.2)
BASOPHILS NFR BLD: 0.2 % (ref 0–1.9)
BILIRUB SERPL-MCNC: 0.5 MG/DL (ref 0.1–1)
BUN SERPL-MCNC: 15 MG/DL (ref 6–20)
CALCIUM SERPL-MCNC: 8.9 MG/DL (ref 8.7–10.5)
CHLORIDE SERPL-SCNC: 105 MMOL/L (ref 95–110)
CO2 SERPL-SCNC: 24 MMOL/L (ref 23–29)
CREAT SERPL-MCNC: 0.8 MG/DL (ref 0.5–1.4)
CRP SERPL-MCNC: 1.96 MG/DL
DIFFERENTIAL METHOD: ABNORMAL
EOSINOPHIL # BLD AUTO: 0 K/UL (ref 0–0.5)
EOSINOPHIL NFR BLD: 0 % (ref 0–8)
ERYTHROCYTE [DISTWIDTH] IN BLOOD BY AUTOMATED COUNT: 14.9 % (ref 11.5–14.5)
EST. GFR  (AFRICAN AMERICAN): >60 ML/MIN/1.73 M^2
EST. GFR  (NON AFRICAN AMERICAN): >60 ML/MIN/1.73 M^2
FERRITIN SERPL-MCNC: 453 NG/ML (ref 20–300)
GLUCOSE SERPL-MCNC: 98 MG/DL (ref 70–110)
HCT VFR BLD AUTO: 46.9 % (ref 40–54)
HGB BLD-MCNC: 15.6 G/DL (ref 14–18)
IMM GRANULOCYTES # BLD AUTO: 0.1 K/UL (ref 0–0.04)
IMM GRANULOCYTES NFR BLD AUTO: 0.8 % (ref 0–0.5)
LYMPHOCYTES # BLD AUTO: 3 K/UL (ref 1–4.8)
LYMPHOCYTES NFR BLD: 24 % (ref 18–48)
MAGNESIUM SERPL-MCNC: 2.2 MG/DL (ref 1.6–2.6)
MCH RBC QN AUTO: 31 PG (ref 27–31)
MCHC RBC AUTO-ENTMCNC: 33.3 G/DL (ref 32–36)
MCV RBC AUTO: 93 FL (ref 82–98)
MONOCYTES # BLD AUTO: 1.1 K/UL (ref 0.3–1)
MONOCYTES NFR BLD: 9 % (ref 4–15)
NEUTROPHILS # BLD AUTO: 8.4 K/UL (ref 1.8–7.7)
NEUTROPHILS NFR BLD: 66 % (ref 38–73)
NRBC BLD-RTO: 0 /100 WBC
PHOSPHATE SERPL-MCNC: 3.4 MG/DL (ref 2.7–4.5)
PLATELET # BLD AUTO: 507 K/UL (ref 150–350)
PMV BLD AUTO: 10.4 FL (ref 9.2–12.9)
POTASSIUM SERPL-SCNC: 3.8 MMOL/L (ref 3.5–5.1)
PROT SERPL-MCNC: 7.6 G/DL (ref 6–8.4)
RBC # BLD AUTO: 5.04 M/UL (ref 4.6–6.2)
SODIUM SERPL-SCNC: 139 MMOL/L (ref 136–145)
WBC # BLD AUTO: 12.69 K/UL (ref 3.9–12.7)

## 2020-11-27 PROCEDURE — 86140 C-REACTIVE PROTEIN: CPT

## 2020-11-27 PROCEDURE — 83735 ASSAY OF MAGNESIUM: CPT

## 2020-11-27 PROCEDURE — 84100 ASSAY OF PHOSPHORUS: CPT

## 2020-11-27 PROCEDURE — 85025 COMPLETE CBC W/AUTO DIFF WBC: CPT

## 2020-11-27 PROCEDURE — 25000003 PHARM REV CODE 250: Performed by: INTERNAL MEDICINE

## 2020-11-27 PROCEDURE — 36415 COLL VENOUS BLD VENIPUNCTURE: CPT

## 2020-11-27 PROCEDURE — 82728 ASSAY OF FERRITIN: CPT

## 2020-11-27 PROCEDURE — 25000003 PHARM REV CODE 250: Performed by: NURSE PRACTITIONER

## 2020-11-27 PROCEDURE — 80053 COMPREHEN METABOLIC PANEL: CPT

## 2020-11-27 PROCEDURE — 25000242 PHARM REV CODE 250 ALT 637 W/ HCPCS: Performed by: INTERNAL MEDICINE

## 2020-11-27 RX ADMIN — DEXAMETHASONE 6 MG: 2 TABLET ORAL at 08:11

## 2020-11-27 RX ADMIN — REMDESIVIR 100 MG: 100 INJECTION, POWDER, LYOPHILIZED, FOR SOLUTION INTRAVENOUS at 08:11

## 2020-11-27 RX ADMIN — PANTOPRAZOLE SODIUM 40 MG: 40 TABLET, DELAYED RELEASE ORAL at 06:11

## 2020-11-27 RX ADMIN — Medication 1000 UNITS: at 08:11

## 2020-11-27 RX ADMIN — OXYCODONE HYDROCHLORIDE AND ACETAMINOPHEN 500 MG: 500 TABLET ORAL at 08:11

## 2020-11-27 RX ADMIN — THERA TABS 1 TABLET: TAB at 08:11

## 2020-11-27 NOTE — PLAN OF CARE
11/27/20 1241   Final Note   Assessment Type Final Discharge Note   Anticipated Discharge Disposition Home   Post-Acute Status   Post-Acute Authorization Other   Other Status No Post-Acute Service Needs   Discharge Delays None known at this time     No DC or Post Acute needs at this time noted by CM.

## 2020-11-27 NOTE — CARE UPDATE
11/26/20 1250   Patient Assessment/Suction   Level of Consciousness (AVPU) alert   Respiratory Effort Unlabored   Expansion/Accessory Muscles/Retractions no retractions   All Lung Fields Breath Sounds diminished   Rhythm/Pattern, Respiratory no shortness of breath reported   Cough Frequency no cough   PRE-TX-O2   O2 Device (Oxygen Therapy) room air   SpO2 95 %   Pulse Oximetry Type Intermittent   $ Pulse Oximetry - Multiple Charge Pulse Oximetry - Multiple   Pulse 75   Resp 20   Positioning   Head of Bed (HOB) HOB elevated;HOB at 30 degrees   Respiratory Evaluation   $ Care Plan Tech Time 15 min

## 2020-11-27 NOTE — DISCHARGE SUMMARY
[facility] Medicine  Discharge Summary      Patient Name: John Ball  MRN: [id number]  Admission Date: 11/24/2020  Hospital Length of Stay: 3 days  Discharge Date and Time:  11/27/2020 11:28 AM  Attending Physician: Marquis Zacarias MD   Discharging Provider: Marquis Zacarias MD  Primary Care Provider: Liz Caruso MD    HPI:   John Ball is a 45 y.o. old  male who  has no past medical history on file.. The patient presented to [facility] on 11/24/2020 with a primary complaint of Fever (x2 weeks. Told to come back by infectious disease doctor bc he doesnt have a spleen and they can't explain fever. ) and Headache  45-year-old  male presents emergency room with fever and headache and body  This patient has a past medical history of a splenectomy secondary to.  He was recently admitted to this hospital 2 weeks ago and had a full infectious disease workup.  Dr. Caruso with ID strongly felt that the patient was infected with the COVID-19 virus and actually started him on Redesiver despite his negative Covid 19 testing at that time.    He was discharged home on doxycycline which she is currently taking.  He presented today with it persistent fever spikes and headaches.  The patient states about 2 weeks ago he attended a [employer] gathering with greater than 100 people.  He did not wear his mask nor were most of  the people at the event wearing there mask.  The patient believes he contracted the COVID-19 virus from this source    Hospital Course:   The patient was admitted to the hospital for workup and treatment including empiric IV antibiotics, supplemental oxygen, and bronchodilators.  Consultation was obtained with Infectious Disease.  Uncertain if this patient is relapsing or if this is new/recurrent episode of COVID.  The patient was treated with mucolytic/antitussives as needed.  He received dexamethasone.  He received IV remdesivir.  The patient had mild  hypoxemia that quickly resolved.  He received vitamin-C and D supplements.  Serial inflammatory markers trended downward.  Procalcitonin was negative and empiric IV antibiotics were weaned.  The patient has been asymptomatic for the last 2 days.  He has completed 3 days of IV remdesivir.  The patient received convalescent plasma transfusion.  The patient mobilized and used prone positioning as able.  At this point he is medically stable for discharge home and is requesting discharge.  I discussed the case with Infectious Disease.  At this point the patient is medically stable for discharge home.    Consults:   Consults (From admission, onward)        Status Ordering Provider     Inpatient consult to Hospitalist  Once     Provider:  Tobin Weeks MD    Acknowledged HORACE CUNNINGHAM     Inpatient consult to Infectious Diseases  Once     Provider:  Catarina Caruso MD    Completed HORACE CUNNINGHAM     Inpatient consult to Infectious Diseases  Once     Provider:  Catarina Caruso MD    Completed YOLANAD HUGHES     Pharmacy Remdesivir Consult  Once     Provider:  (Not yet assigned)    CATARINA Huang        Discharge diagnoses:  Covid 19 infection with RLL pneumonia  Mild hypoxemia secondary to pneumonia, improved  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  Hyperlipidemia  Final Active Diagnoses:    Diagnosis Date Noted POA    PRINCIPAL PROBLEM:  Pneumonia due to COVID-19 virus [U07.1, J12.89]  Yes    COVID-19 virus detected [U07.1] 11/24/2020 Yes    Pneumonia/ right lower lobe [J18.9] 11/24/2020 Yes    COVID-19 virus infection [U07.1] 11/24/2020 Yes    Hyperlipidemia [E78.5] 11/17/2020 Yes     Chronic    Fever [R50.9]  Yes    Asplenia [Q89.01]  Not Applicable     Discharge physical exam:  Comfortable appearing, nontoxic, no apparent distress  Alert and oriented, fluent speech, in good spirits  Comfortable work of breathing, lungs are clear to auscultation bilaterally  Moist mucous  membranes, 2+ radial pulses    Discharged Condition: stable    Disposition: Home or Self Care    Follow Up:  Follow-up Information     Liz Caruso MD In 2 weeks.    Specialty: Infectious Diseases  Contact information:  [facility]  [address]  [facility] [address] [phone]                 Patient Instructions:      Diet Adult Regular     Order Specific Question Answer Comments   Additional restrictions: Low Chol/Sat Fat      Other restrictions (specify):   Order Comments: ISOLATION FOR 10 DAYS     Notify your health care provider if you experience any of the following:  temperature >100.4     Notify your health care provider if you experience any of the following:  persistent nausea and vomiting or diarrhea     Notify your health care provider if you experience any of the following:  difficulty breathing or increased cough         Pending Diagnostic Studies:     None         Medications:  Reconciled Home Medications:      Medication List      CONTINUE taking these medications    atorvastatin 10 MG tablet  Commonly known as: LIPITOR  Take 10 mg by mouth once daily.     pantoprazole 40 MG tablet  Commonly known as: PROTONIX  Take 40 mg by mouth every morning.        STOP taking these medications    doxycycline 100 MG Cap  Commonly known as: VIBRAMYCIN            Indwelling Lines/Drains at time of discharge:   Lines/Drains/Airways     None                 Time spent on the discharge of patient: 35 minutes  Patient was seen and examined on the date of discharge and determined to be suitable for discharge.         Marquis Zacarias MD  Department of Hospital Medicine  [facility]

## 2020-11-29 LAB
BACTERIA BLD CULT: NORMAL
BACTERIA BLD CULT: NORMAL

## 2021-01-07 ENCOUNTER — OFFICE VISIT (OUTPATIENT)
Dept: INFECTIOUS DISEASES | Facility: CLINIC | Age: 46
End: 2021-01-07
Payer: COMMERCIAL

## 2021-01-07 VITALS
WEIGHT: 195 LBS | SYSTOLIC BLOOD PRESSURE: 125 MMHG | DIASTOLIC BLOOD PRESSURE: 83 MMHG | BODY MASS INDEX: 30.61 KG/M2 | HEIGHT: 67 IN | HEART RATE: 96 BPM | OXYGEN SATURATION: 95 % | TEMPERATURE: 97 F

## 2021-01-07 DIAGNOSIS — Z71.89 EDUCATED ABOUT COVID-19 VIRUS INFECTION: ICD-10-CM

## 2021-01-07 DIAGNOSIS — Q89.01 ASPLENIA: ICD-10-CM

## 2021-01-07 DIAGNOSIS — U07.1 COVID-19 VIRUS INFECTION: Primary | ICD-10-CM

## 2021-01-07 PROCEDURE — 99214 PR OFFICE/OUTPT VISIT, EST, LEVL IV, 30-39 MIN: ICD-10-PCS | Mod: S$GLB,,, | Performed by: INTERNAL MEDICINE

## 2021-01-07 PROCEDURE — 1126F PR PAIN SEVERITY QUANTIFIED, NO PAIN PRESENT: ICD-10-PCS | Mod: S$GLB,,, | Performed by: INTERNAL MEDICINE

## 2021-01-07 PROCEDURE — 3008F PR BODY MASS INDEX (BMI) DOCUMENTED: ICD-10-PCS | Mod: S$GLB,,, | Performed by: INTERNAL MEDICINE

## 2021-01-07 PROCEDURE — 3008F BODY MASS INDEX DOCD: CPT | Mod: S$GLB,,, | Performed by: INTERNAL MEDICINE

## 2021-01-07 PROCEDURE — 1126F AMNT PAIN NOTED NONE PRSNT: CPT | Mod: S$GLB,,, | Performed by: INTERNAL MEDICINE

## 2021-01-07 PROCEDURE — 99214 OFFICE O/P EST MOD 30 MIN: CPT | Mod: S$GLB,,, | Performed by: INTERNAL MEDICINE

## 2021-01-07 RX ORDER — AMOXICILLIN AND CLAVULANATE POTASSIUM 875; 125 MG/1; MG/1
TABLET, FILM COATED ORAL
Qty: 20 TABLET | Refills: 3 | Status: SHIPPED | OUTPATIENT
Start: 2021-01-07 | End: 2023-05-01

## 2021-01-07 RX ORDER — CHOLECALCIFEROL (VITAMIN D3) 25 MCG
1000 TABLET ORAL DAILY
COMMUNITY
End: 2023-07-14 | Stop reason: CLARIF

## 2021-01-07 RX ORDER — ASPIRIN 81 MG/1
81 TABLET ORAL DAILY
COMMUNITY

## 2021-11-10 ENCOUNTER — HOSPITAL ENCOUNTER (OUTPATIENT)
Dept: RADIOLOGY | Facility: HOSPITAL | Age: 46
Discharge: HOME | End: 2021-11-10
Attending: INTERNAL MEDICINE

## 2021-11-10 DIAGNOSIS — Z00.00 ANNUAL PHYSICAL EXAM: ICD-10-CM

## 2022-08-17 RX ORDER — ROSUVASTATIN CALCIUM 10 MG/1
10 TABLET, COATED ORAL NIGHTLY
Qty: 90 TABLET | Refills: 3 | Status: SHIPPED | OUTPATIENT
Start: 2022-08-17 | End: 2023-12-06 | Stop reason: ALTCHOICE

## 2023-05-01 PROBLEM — S46.011A TRAUMATIC COMPLETE TEAR OF RIGHT ROTATOR CUFF: Status: ACTIVE | Noted: 2023-05-01

## 2023-09-08 PROBLEM — Z98.890 S/P RIGHT ROTATOR CUFF REPAIR: Status: ACTIVE | Noted: 2023-09-08

## 2023-10-06 ENCOUNTER — TELEPHONE (OUTPATIENT)
Dept: CARDIOLOGY | Facility: CLINIC | Age: 48
End: 2023-10-06
Payer: COMMERCIAL

## 2023-10-06 NOTE — TELEPHONE ENCOUNTER
----- Message from Renetta Li sent at 10/6/2023  9:44 AM CDT -----  Type:  RX Refill Request    Who Called:  pt  Refill or New Rx:  refill  RX Name and Strength:  rosuvastatin (CRESTOR) 10 MG tablet  How is the patient currently taking it? (ex. 1XDay):  as directed  Is this a 30 day or 90 day RX:  90  Preferred Pharmacy with phone number:    [id number] - [place], [place] - [address] AT [facility] [address] [facility] [address]  [address]  [place] [address]  Phone: [phone] Fax: [phone]  Local or Mail Order:  local  Ordering Provider:  Ursula Lopez Call Back Number:  [phone] (home) [phone] (work)    Additional Information:  please call and advise--thank you

## 2023-10-06 NOTE — TELEPHONE ENCOUNTER
----- Message from Renetta Li sent at 10/6/2023  9:44 AM CDT -----  Type:  RX Refill Request    Who Called:  pt  Refill or New Rx:  refill  RX Name and Strength:  rosuvastatin (CRESTOR) 10 MG tablet  How is the patient currently taking it? (ex. 1XDay):  as directed  Is this a 30 day or 90 day RX:  90  Preferred Pharmacy with phone number:    [id number] - [place], [place] - [address] AT [facility] & [facility] [address]  [address]  [place] [address]  Phone: [phone] Fax: [phone]  Local or Mail Order:  local  Ordering Provider:  Ursula Lopez Call Back Number:  [phone] (home) [phone] (work)    Additional Information:  please call and advise--thank you

## 2023-10-13 RX ORDER — ROSUVASTATIN CALCIUM 10 MG/1
10 TABLET, COATED ORAL NIGHTLY
Qty: 90 TABLET | Refills: 3 | OUTPATIENT
Start: 2023-10-13

## 2023-10-30 RX ORDER — ROSUVASTATIN CALCIUM 10 MG/1
10 TABLET, COATED ORAL NIGHTLY
Qty: 90 TABLET | Refills: 3 | OUTPATIENT
Start: 2023-10-30

## 2023-11-01 ENCOUNTER — OFFICE VISIT (OUTPATIENT)
Dept: PODIATRY | Facility: CLINIC | Age: 48
End: 2023-11-01
Payer: COMMERCIAL

## 2023-11-01 VITALS — WEIGHT: 186.5 LBS | HEIGHT: 66 IN | OXYGEN SATURATION: 97 % | BODY MASS INDEX: 29.97 KG/M2 | HEART RATE: 108 BPM

## 2023-11-01 DIAGNOSIS — M72.2 PLANTAR FASCIITIS: Primary | ICD-10-CM

## 2023-11-01 DIAGNOSIS — M79.672 LEFT FOOT PAIN: ICD-10-CM

## 2023-11-01 PROCEDURE — 99999 PR PBB SHADOW E&M-EST. PATIENT-LVL III: CPT | Mod: PBBFAC,,, | Performed by: PODIATRIST

## 2023-11-01 PROCEDURE — 1159F MED LIST DOCD IN RCRD: CPT | Mod: CPTII,S$GLB,, | Performed by: PODIATRIST

## 2023-11-01 PROCEDURE — 3044F PR MOST RECENT HEMOGLOBIN A1C LEVEL <7.0%: ICD-10-PCS | Mod: CPTII,S$GLB,, | Performed by: PODIATRIST

## 2023-11-01 PROCEDURE — 4010F ACE/ARB THERAPY RXD/TAKEN: CPT | Mod: CPTII,S$GLB,, | Performed by: PODIATRIST

## 2023-11-01 PROCEDURE — 3008F BODY MASS INDEX DOCD: CPT | Mod: CPTII,S$GLB,, | Performed by: PODIATRIST

## 2023-11-01 PROCEDURE — 99203 PR OFFICE/OUTPT VISIT, NEW, LEVL III, 30-44 MIN: ICD-10-PCS | Mod: S$GLB,,, | Performed by: PODIATRIST

## 2023-11-01 PROCEDURE — 1159F PR MEDICATION LIST DOCUMENTED IN MEDICAL RECORD: ICD-10-PCS | Mod: CPTII,S$GLB,, | Performed by: PODIATRIST

## 2023-11-01 PROCEDURE — 99999 PR PBB SHADOW E&M-EST. PATIENT-LVL III: ICD-10-PCS | Mod: PBBFAC,,, | Performed by: PODIATRIST

## 2023-11-01 PROCEDURE — 3044F HG A1C LEVEL LT 7.0%: CPT | Mod: CPTII,S$GLB,, | Performed by: PODIATRIST

## 2023-11-01 PROCEDURE — 1160F PR REVIEW ALL MEDS BY PRESCRIBER/CLIN PHARMACIST DOCUMENTED: ICD-10-PCS | Mod: CPTII,S$GLB,, | Performed by: PODIATRIST

## 2023-11-01 PROCEDURE — 3008F PR BODY MASS INDEX (BMI) DOCUMENTED: ICD-10-PCS | Mod: CPTII,S$GLB,, | Performed by: PODIATRIST

## 2023-11-01 PROCEDURE — 1160F RVW MEDS BY RX/DR IN RCRD: CPT | Mod: CPTII,S$GLB,, | Performed by: PODIATRIST

## 2023-11-01 PROCEDURE — 4010F PR ACE/ARB THEARPY RXD/TAKEN: ICD-10-PCS | Mod: CPTII,S$GLB,, | Performed by: PODIATRIST

## 2023-11-01 PROCEDURE — 99203 OFFICE O/P NEW LOW 30 MIN: CPT | Mod: S$GLB,,, | Performed by: PODIATRIST

## 2023-11-01 RX ORDER — MELOXICAM 15 MG/1
15 TABLET ORAL DAILY
Qty: 30 TABLET | Refills: 1 | Status: SHIPPED | OUTPATIENT
Start: 2023-11-01 | End: 2023-12-28

## 2023-11-01 NOTE — PATIENT INSTRUCTIONS

## 2023-11-01 NOTE — PROGRESS NOTES
"  [address] [facility] [address]  [place] [address]  Phone: [phone]   [phone]    Patient's PCP:Bayron Hawk DO  Referring Provider: Aaareftunde Self    Subjective:      Chief Complaint:: Heel Pain (Left heel)    HPI  John Ball is a 48 y.o. male who presents today with a complaint of left heel pain. The current episode started years ago but starting to get worse over the last few months.  The symptoms include stinging, burning and stiffness in heel area of foot . Probable cause of complaint unknown.  The symptoms are aggravated by suddenly standing after laying in bed or driving. The problem has worsened. Treatment to date have included stretching, different shoes which provided no relief. Patient states that he has a history of calcium build up in his bones, doesn't know if that could be the issue with heel.         Vitals:    11/01/23 1407   Pulse: 108   SpO2: 97%   Weight: 84.6 kg (186 lb 8.2 oz)   Height: 5' 6" (1.676 m)   PainSc:   2      Shoe Size: 8-8.5    Past Surgical History:   Procedure Laterality Date    ARTHROSCOPIC REPAIR OF ROTATOR CUFF OF SHOULDER Right 7/26/2023    Procedure: REPAIR, ROTATOR CUFF, ARTHROSCOPIC;  Surgeon: Patel Leo II, MD;  Location: [facility] OR;  Service: Orthopedics;  Laterality: Right;    ARTHROSCOPY OF SHOULDER WITH DECOMPRESSION OF SUBACROMIAL SPACE Right 7/26/2023    Procedure: ARTHROSCOPY, SHOULDER, WITH SUBACROMIAL SPACE DECOMPRESSION;  Surgeon: Patel Leo II, MD;  Location: [facility] OR;  Service: Orthopedics;  Laterality: Right;    SINUS SURGERY      SPLENECTOMY, TOTAL      TONSILLECTOMY       Past Medical History:   Diagnosis Date    COVID-19 11/2020    last covid 6/29/2023     Family History   Problem Relation Age of Onset    No Known Problems Mother     Diabetes Father         Social History:   Marital Status:   Alcohol History:  reports current alcohol use.  Tobacco History:  reports that he quit smoking about 26 years ago. His " smoking use included cigarettes. He started smoking about 27 years ago. He has a 0.1 pack-year smoking history. He has never used smokeless tobacco.  Drug History:  reports current drug use.    Review of patient's allergies indicates:  No Known Allergies    Current Outpatient Medications   Medication Sig Dispense Refill    aspirin (ECOTRIN) 81 MG EC tablet Take 81 mg by mouth once daily.      ibuprofen (ADVIL,MOTRIN) 400 MG tablet Take 1 tablet (400 mg total) by mouth every 6 (six) hours as needed. 30 tablet 1    losartan (COZAAR) 50 MG tablet Take 50 mg by mouth nightly.      MEN'S MULTI-VITAMIN ORAL Take 1 capsule by mouth nightly.      pantoprazole (PROTONIX) 40 MG tablet Take 40 mg by mouth nightly.      rosuvastatin (CRESTOR) 10 MG tablet Take 1 tablet (10 mg total) by mouth every evening. 90 tablet 3    tadalafiL (CIALIS) 20 MG Tab Take 20 mg by mouth as needed.       No current facility-administered medications for this visit.       Review of Systems   Constitutional:  Negative for chills, fatigue, fever and unexpected weight change.   HENT:  Negative for hearing loss and trouble swallowing.    Eyes:  Negative for photophobia and visual disturbance.   Respiratory:  Negative for cough, shortness of breath and wheezing.    Cardiovascular:  Negative for chest pain, palpitations and leg swelling.   Gastrointestinal:  Negative for abdominal pain and nausea.   Genitourinary:  Negative for dysuria and frequency.   Musculoskeletal:  Negative for arthralgias, back pain, gait problem, joint swelling, myalgias and neck pain.   Skin:  Negative for rash and wound.   Neurological:  Negative for tremors, seizures, weakness, numbness and headaches.   Hematological:  Does not bruise/bleed easily.         Objective:        Physical Exam:   Foot Exam    General  General Appearance: appears stated age and healthy   Orientation: alert and oriented to person, place, and time   Affect: appropriate   Gait: unimpaired       Left  Foot/Ankle      Inspection and Palpation  Ecchymosis: none  Tenderness: plantar fascia   Swelling: none   Arch: normal  Hammertoes: absent  Claw toes: absent  Hallux valgus: no  Hallux limitus: no  Skin Exam: callus; no drainage, no ulcer and no erythema   Neurovascular  Dorsalis pedis: 2+  Posterior tibial: 2+  Capillary refill: 2+  Varicose veins: not present  Saphenous nerve sensation: normal  Tibial nerve sensation: normal  Superficial peroneal nerve sensation: normal  Deep peroneal nerve sensation: normal  Sural nerve sensation: normal    Muscle Strength  Ankle dorsiflexion: 5  Ankle plantar flexion: 5  Ankle inversion: 5  Ankle eversion: 5  Great toe extension: 5  Great toe flexion: 5    Range of Motion    Normal left ankle ROM    Tests  Anterior drawer: negative   Talar tilt: negative   PT Tinel's sign: negative  Paresthesia: negative  Comments  Pain on palpation of the infeior medial heel, central plantar heel, and posterior heel. No pain present  with side to side compression of the calcaneus. Negative tinnel's sign  at the tarsal tunnel. Negative Ochoa's nerve pain. Negative Calcaneal nerve pain. No soft tissue masses. Pain absent  with dorsiflexion of the ankle. No edema, erythema, or ecchymosis noted.       Physical Exam  Cardiovascular:      Pulses:           Dorsalis pedis pulses are 2+ on the left side.        Posterior tibial pulses are 2+ on the left side.   Musculoskeletal:      Left foot: No bunion.   Feet:      Left foot:      Skin integrity: Callus present. No ulcer or erythema.               Left Ankle/Foot Exam     Range of Motion   The patient has normal left ankle ROM.     Comments:  Pain on palpation of the infeior medial heel, central plantar heel, and posterior heel. No pain present  with side to side compression of the calcaneus. Negative tinnel's sign  at the tarsal tunnel. Negative Ochoa's nerve pain. Negative Calcaneal nerve pain. No soft tissue masses. Pain absent  with  dorsiflexion of the ankle. No edema, erythema, or ecchymosis noted.         Muscle Strength   Left Lower Extremity   Ankle Dorsiflexion:  5   Plantar flexion:  5/5     Vascular Exam       Left Pulses  Dorsalis Pedis:      2+  Posterior Tibial:      2+           Imaging: none            Assessment:       1. Plantar fasciitis    2. Left foot pain      Plan:   Plantar fasciitis    Left foot pain      Follow up if symptoms worsen or fail to improve.    Procedures        Discussed different treatment options for heel pain. I gave written and verbal instructions on heel cord stretching and this was demonstrated for the patient. Patient expressed understanding. Discussed wearing appropriate shoe gear and avoiding flats, slippers, sandals, barefoot, and sockfeet. Recommended arch supports. My recommendation for OTC supports is Spenco polysorb replacement insoles or patient may elect more aggressive treatment with prescription arch supports. We also discussed cortisone injections and NSAID therapy.       Counseling:     I provided patient education verbally regarding:   Patient diagnosis, treatment options, as well as alternatives, risks, and benefits.     This note was created using Dragon voice recognition software that occasionally misinterpreted phrases or words.

## 2023-12-28 DIAGNOSIS — M72.2 PLANTAR FASCIITIS: ICD-10-CM

## 2023-12-28 DIAGNOSIS — M79.672 LEFT FOOT PAIN: ICD-10-CM

## 2023-12-28 RX ORDER — MELOXICAM 15 MG/1
15 TABLET ORAL
Qty: 30 TABLET | Refills: 1 | Status: SHIPPED | OUTPATIENT
Start: 2023-12-28

## 2024-05-13 ENCOUNTER — HOSPITAL ENCOUNTER (OUTPATIENT)
Dept: RADIOLOGY | Facility: CLINIC | Age: 49
Discharge: HOME | End: 2024-05-13
Attending: PODIATRIST
Payer: COMMERCIAL

## 2024-05-13 ENCOUNTER — OFFICE VISIT (OUTPATIENT)
Dept: PODIATRY | Facility: CLINIC | Age: 49
End: 2024-05-13
Payer: COMMERCIAL

## 2024-05-13 VITALS — BODY MASS INDEX: 30.11 KG/M2 | OXYGEN SATURATION: 95 % | HEIGHT: 66 IN | WEIGHT: 187.38 LBS | HEART RATE: 92 BPM

## 2024-05-13 DIAGNOSIS — M72.2 PLANTAR FASCIITIS: Primary | ICD-10-CM

## 2024-05-13 DIAGNOSIS — M76.62 ACHILLES TENDINITIS OF LEFT LOWER EXTREMITY: ICD-10-CM

## 2024-05-13 DIAGNOSIS — M79.672 LEFT FOOT PAIN: ICD-10-CM

## 2024-05-13 PROCEDURE — 1160F RVW MEDS BY RX/DR IN RCRD: CPT | Mod: CPTII,S$GLB,, | Performed by: PODIATRIST

## 2024-05-13 PROCEDURE — 73630 X-RAY EXAM OF FOOT: CPT | Mod: LT,S$GLB,, | Performed by: RADIOLOGY

## 2024-05-13 PROCEDURE — 20550 NJX 1 TENDON SHEATH/LIGAMENT: CPT | Mod: LT,S$GLB,, | Performed by: PODIATRIST

## 2024-05-13 PROCEDURE — 1159F MED LIST DOCD IN RCRD: CPT | Mod: CPTII,S$GLB,, | Performed by: PODIATRIST

## 2024-05-13 PROCEDURE — 99213 OFFICE O/P EST LOW 20 MIN: CPT | Mod: 25,S$GLB,, | Performed by: PODIATRIST

## 2024-05-13 PROCEDURE — 4010F ACE/ARB THERAPY RXD/TAKEN: CPT | Mod: CPTII,S$GLB,, | Performed by: PODIATRIST

## 2024-05-13 PROCEDURE — 3008F BODY MASS INDEX DOCD: CPT | Mod: CPTII,S$GLB,, | Performed by: PODIATRIST

## 2024-05-13 PROCEDURE — 99999 PR PBB SHADOW E&M-EST. PATIENT-LVL IV: CPT | Mod: PBBFAC,,, | Performed by: PODIATRIST

## 2024-05-13 RX ORDER — MELOXICAM 15 MG/1
15 TABLET ORAL DAILY
Qty: 30 TABLET | Refills: 3 | Status: SHIPPED | OUTPATIENT
Start: 2024-05-13

## 2024-05-13 NOTE — PATIENT INSTRUCTIONS

## 2024-05-13 NOTE — PROGRESS NOTES
"  [address] [facility] [address]  [place] [address]  Phone: [phone]   [phone]    Patient's PCP:Bayron Hawk DO  Referring Provider: No ref. provider found    Subjective:      Chief Complaint:: Foot Problem (Left heel/ achilles tendon )    DANDRE Ball is a 49 y.o. male who presents today with a complaint of left heel/ achilles pain. The current episode started over a year.  The symptoms include stabbing pain that comes and goes. Probable cause of complaint none.  The symptoms are aggravated by stopping physical activity. The problem has stayed the same. Treatment to date have included stretching, rolling on a ball and foot massager  which provided some relief.         Vitals:    05/13/24 1539   Pulse: 92   SpO2: 95%   Weight: 85 kg (187 lb 6.3 oz)   Height: 5' 6" (1.676 m)   PainSc: 0-No pain      Shoe Size: 9    Past Surgical History:   Procedure Laterality Date    ARTHROSCOPIC REPAIR OF ROTATOR CUFF OF SHOULDER Right 7/26/2023    Procedure: REPAIR, ROTATOR CUFF, ARTHROSCOPIC;  Surgeon: Patel Leo II, MD;  Location: [facility];  Service: Orthopedics;  Laterality: Right;    ARTHROSCOPY OF SHOULDER WITH DECOMPRESSION OF SUBACROMIAL SPACE Right 7/26/2023    Procedure: ARTHROSCOPY, SHOULDER, WITH SUBACROMIAL SPACE DECOMPRESSION;  Surgeon: Patel Leo II, MD;  Location: [facility];  Service: Orthopedics;  Laterality: Right;    SINUS SURGERY      SPLENECTOMY, TOTAL      TONSILLECTOMY       Past Medical History:   Diagnosis Date    COVID-19 11/2020    last covid 6/29/2023     Family History   Problem Relation Name Age of Onset    No Known Problems Mother      Diabetes Father          Social History:   Marital Status:   Alcohol History:  reports current alcohol use.  Tobacco History:  reports that he quit smoking about 27 years ago. His smoking use included cigarettes. He started smoking about 27 years ago. He has a 0.1 pack-year smoking history. He has never used smokeless " tobacco.  Drug History:  reports current drug use.    Review of patient's allergies indicates:  No Known Allergies    Current Outpatient Medications   Medication Sig Dispense Refill    aspirin (ECOTRIN) 81 MG EC tablet Take 81 mg by mouth once daily.      ibuprofen (ADVIL,MOTRIN) 400 MG tablet Take 1 tablet (400 mg total) by mouth every 6 (six) hours as needed. 30 tablet 1    losartan (COZAAR) 50 MG tablet Take 50 mg by mouth nightly.      MEN'S MULTI-VITAMIN ORAL Take 1 capsule by mouth nightly.      pantoprazole (PROTONIX) 40 MG tablet Take 40 mg by mouth nightly.      tadalafiL (CIALIS) 20 MG Tab Take 20 mg by mouth as needed.      atorvastatin (LIPITOR) 10 MG tablet Take 10 mg by mouth. (Patient not taking: Reported on 5/13/2024)      meloxicam (MOBIC) 15 MG tablet TAKE 1 TABLET(15 MG) BY MOUTH EVERY DAY (Patient not taking: Reported on 5/13/2024) 30 tablet 1    meloxicam (MOBIC) 15 MG tablet Take 1 tablet (15 mg total) by mouth once daily. 30 tablet 3     No current facility-administered medications for this visit.       Review of Systems   Constitutional:  Negative for chills, fatigue, fever and unexpected weight change.   HENT:  Negative for hearing loss and trouble swallowing.    Eyes:  Negative for photophobia and visual disturbance.   Respiratory:  Negative for cough, shortness of breath and wheezing.    Cardiovascular:  Negative for chest pain, palpitations and leg swelling.   Gastrointestinal:  Negative for abdominal pain and nausea.   Genitourinary:  Negative for dysuria and frequency.   Musculoskeletal:  Negative for arthralgias, back pain, gait problem, joint swelling and myalgias.   Skin:  Negative for rash and wound.   Neurological:  Negative for seizures, weakness, numbness and headaches.   Hematological:  Does not bruise/bleed easily.         Objective:        Physical Exam:   Foot Exam    General  General Appearance: appears stated age and healthy   Orientation: alert and oriented to person,  place, and time   Affect: appropriate   Gait: antalgic       Left Foot/Ankle      Inspection and Palpation  Ecchymosis: none  Tenderness: plantar fascia and calcaneus tenderness (Pain posterior inferior calcaneus by proximal plantar fascial attachment to inferior calcaneus.  No masses felt)  Swelling: none   Arch: pes cavus  Hammertoes: absent  Claw toes: absent  Hallux valgus: no  Hallux limitus: no  Skin Exam: skin intact;   Neurovascular  Dorsalis pedis: 2+  Posterior tibial: 2+  Capillary refill: 2+  Saphenous nerve sensation: normal  Tibial nerve sensation: normal  Superficial peroneal nerve sensation: normal  Deep peroneal nerve sensation: normal  Sural nerve sensation: normal    Muscle Strength  Ankle dorsiflexion: 5  Ankle plantar flexion: 5  Ankle inversion: 5  Ankle eversion: 5  Great toe extension: 5  Great toe flexion: 5    Range of Motion    Normal left ankle ROM  Passive  Ankle dorsiflexion: 10, pain    Active  Ankle dorsiflexion: 10, pain    Tests  Calcaneal squeeze: negative   PT Tinel's sign: negative  Paresthesia: negative  Comments  Pain on palpation of the infeior medial heel, central plantar heel, and posterior heel. No pain present  with side to side compression of the calcaneus. Negative tinnel's sign  at the tarsal tunnel. Negative Ochoa's nerve pain. Negative Calcaneal nerve pain. No soft tissue masses. Pain present with dorsiflexion of the ankle. No edema, erythema, or ecchymosis noted.      Physical Exam  Cardiovascular:      Pulses:           Dorsalis pedis pulses are 2+ on the left side.        Posterior tibial pulses are 2+ on the left side.   Musculoskeletal:      Left foot: No bunion.        Feet:                Left Ankle/Foot Exam     Range of Motion   The patient has normal left ankle ROM.     Muscle Strength   The patient has normal left ankle strength.    Comments:  Pain on palpation of the infeior medial heel, central plantar heel, and posterior heel. No pain present  with side  to side compression of the calcaneus. Negative tinnel's sign  at the tarsal tunnel. Negative Ochoa's nerve pain. Negative Calcaneal nerve pain. No soft tissue masses. Pain present with dorsiflexion of the ankle. No edema, erythema, or ecchymosis noted.        Muscle Strength   Left Lower Extremity   Ankle Dorsiflexion:  5   Plantar flexion:  5/5     Vascular Exam       Left Pulses  Dorsalis Pedis:      2+  Posterior Tibial:      2+           Imaging:   X-Ray Foot Complete Left  Narrative: EXAMINATION:  XR FOOT COMPLETE 3 VIEW LEFT    CLINICAL HISTORY:  .  Pain in left foot    TECHNIQUE:  AP, lateral and oblique views of the left foot were performed.    COMPARISON:  None    FINDINGS:  No acute fracture or dislocation.  Tiny plantar and dorsal calcaneal spurs.  Slight bunion formation 1st metatarsal head.  Impression: As above    Electronically signed by: Rach Rizo MD  Date:    05/13/2024  Time:    16:36            Assessment:       1. Plantar fasciitis - Left Foot    2. Left foot pain    3. Achilles tendinitis of left lower extremity      Plan:   Plantar fasciitis - Left Foot  -     X-Ray Foot Complete Left    Left foot pain  -     X-Ray Foot Complete Left    Achilles tendinitis of left lower extremity    Other orders  -     meloxicam (MOBIC) 15 MG tablet; Take 1 tablet (15 mg total) by mouth once daily.  Dispense: 30 tablet; Refill: 3    Plantar Fasciitis Level II Treatment:   Continue the conservative treatment of Level I, which is:   Anti-inflammatory  No bare feet  Over the counter insert  Restrict activity level   Use running shoes at full time  No impact exercise and stretch gastrocnemius muscle    Steroid Injection:  I injected the inferior posterior aspect of the heel pad with 1-1/2 cc of Marcaine 1 cc of Decadron phosphate 1/2 cc of methylprednisolone.  Patient had give me written permission he tolerated the procedures well.  Get good relief at the time of the injection.  Return in 4 weeks if  needed    Procedures          Counseling:   Discussed different treatment options for heel pain. I gave written and verbal instructions on heel cord stretching and this was demonstrated for the patient. Patient expressed understanding. Discussed wearing appropriate shoe gear and avoiding flats, slippers, sandals, barefoot, and sockfeet. Recommended arch supports. My recommendation for OTC supports is Spenco polysorb replacement insoles or patient may elect more aggressive treatment with prescription arch supports. We also discussed cortisone injections and NSAID therapy.    I provided patient education verbally regarding:   Patient diagnosis, treatment options, as well as alternatives, risks, and benefits.     This note was created using Dragon voice recognition software that occasionally misinterpreted phrases or words.

## 2024-05-17 RX ORDER — METHYLPREDNISOLONE ACETATE 40 MG/ML
20 INJECTION, SUSPENSION INTRA-ARTICULAR; INTRALESIONAL; INTRAMUSCULAR; SOFT TISSUE
Status: COMPLETED | OUTPATIENT
Start: 2024-05-17 | End: 2024-05-17

## 2024-05-17 RX ORDER — BUPIVACAINE HYDROCHLORIDE 5 MG/ML
1.5 INJECTION, SOLUTION PERINEURAL
Status: COMPLETED | OUTPATIENT
Start: 2024-05-17 | End: 2024-05-17

## 2024-05-17 RX ORDER — DEXAMETHASONE SODIUM PHOSPHATE 4 MG/ML
4 INJECTION, SOLUTION INTRA-ARTICULAR; INTRALESIONAL; INTRAMUSCULAR; INTRAVENOUS; SOFT TISSUE
Status: COMPLETED | OUTPATIENT
Start: 2024-05-17 | End: 2024-05-17

## 2024-05-17 RX ADMIN — BUPIVACAINE HYDROCHLORIDE 7.5 MG: 5 INJECTION, SOLUTION PERINEURAL at 12:05

## 2024-05-17 RX ADMIN — METHYLPREDNISOLONE ACETATE 20 MG: 40 INJECTION, SUSPENSION INTRA-ARTICULAR; INTRALESIONAL; INTRAMUSCULAR; SOFT TISSUE at 12:05

## 2024-05-17 RX ADMIN — DEXAMETHASONE SODIUM PHOSPHATE 4 MG: 4 INJECTION, SOLUTION INTRA-ARTICULAR; INTRALESIONAL; INTRAMUSCULAR; INTRAVENOUS; SOFT TISSUE at 12:05

## 2024-06-28 DIAGNOSIS — R93.3 ABNORMAL CT SCAN, GASTROINTESTINAL TRACT: Primary | ICD-10-CM

## 2024-06-28 DIAGNOSIS — K44.9 HIATAL HERNIA: ICD-10-CM

## 2024-06-28 DIAGNOSIS — K21.9 GERD (GASTROESOPHAGEAL REFLUX DISEASE): ICD-10-CM

## 2024-06-28 DIAGNOSIS — K22.70 BARRETT'S ESOPHAGUS: ICD-10-CM

## 2024-06-28 DIAGNOSIS — Z12.11 SCREENING FOR COLON CANCER: ICD-10-CM

## 2024-09-20 DIAGNOSIS — M72.2 PLANTAR FASCIITIS: ICD-10-CM

## 2024-09-20 DIAGNOSIS — M79.672 LEFT FOOT PAIN: ICD-10-CM

## 2024-09-20 RX ORDER — MELOXICAM 15 MG/1
15 TABLET ORAL
Qty: 30 TABLET | Refills: 1 | OUTPATIENT
Start: 2024-09-20